# Patient Record
Sex: MALE | Race: WHITE | NOT HISPANIC OR LATINO | Employment: FULL TIME | URBAN - METROPOLITAN AREA
[De-identification: names, ages, dates, MRNs, and addresses within clinical notes are randomized per-mention and may not be internally consistent; named-entity substitution may affect disease eponyms.]

---

## 2017-06-07 ENCOUNTER — RADIANT APPOINTMENT (OUTPATIENT)
Dept: GENERAL RADIOLOGY | Facility: CLINIC | Age: 27
End: 2017-06-07
Attending: PHYSICIAN ASSISTANT
Payer: COMMERCIAL

## 2017-06-07 ENCOUNTER — OFFICE VISIT (OUTPATIENT)
Dept: FAMILY MEDICINE | Facility: CLINIC | Age: 27
End: 2017-06-07
Payer: COMMERCIAL

## 2017-06-07 VITALS
BODY MASS INDEX: 32.19 KG/M2 | WEIGHT: 205.1 LBS | DIASTOLIC BLOOD PRESSURE: 81 MMHG | TEMPERATURE: 100.6 F | HEART RATE: 102 BPM | HEIGHT: 67 IN | OXYGEN SATURATION: 97 % | SYSTOLIC BLOOD PRESSURE: 132 MMHG

## 2017-06-07 DIAGNOSIS — R05.9 COUGH: ICD-10-CM

## 2017-06-07 DIAGNOSIS — R07.0 THROAT PAIN: Primary | ICD-10-CM

## 2017-06-07 DIAGNOSIS — R50.9 FEVER, UNSPECIFIED: ICD-10-CM

## 2017-06-07 LAB
DEPRECATED S PYO AG THROAT QL EIA: NORMAL
MICRO REPORT STATUS: NORMAL
SPECIMEN SOURCE: NORMAL

## 2017-06-07 PROCEDURE — 71020 XR CHEST 2 VW: CPT

## 2017-06-07 PROCEDURE — 87880 STREP A ASSAY W/OPTIC: CPT | Performed by: PHYSICIAN ASSISTANT

## 2017-06-07 PROCEDURE — 99213 OFFICE O/P EST LOW 20 MIN: CPT | Performed by: PHYSICIAN ASSISTANT

## 2017-06-07 PROCEDURE — 87070 CULTURE OTHR SPECIMN AEROBIC: CPT | Performed by: PHYSICIAN ASSISTANT

## 2017-06-07 NOTE — PROGRESS NOTES
"SUBJECTIVE:                                                    Sergo Cabrera is a 27 year old male who presents to clinic today for the following health issues:    ENT Symptoms             Symptoms: cc Present Absent Comment   Fever/Chills x x     Fatigue  x     Muscle Aches  x  A little achey   Eye Irritation  x  \"mildly irritated\"   Sneezing   x    Nasal [place]/Drg  x  Congestion, mild   Sinus Pressure/Pain   x    Loss of smell   x    Dental pain   x    Sore Throat  x  Mild    Swollen Glands   x    Ear Pain/Fullness x x  pressure and little soreness - left   Cough x x     Wheeze   x    Chest Pain   x    Shortness of breath  x  Off and on \"a little bit\"   Rash   x    Other  x  Headache and neck pain - back of head. Improved with tylenol. Right now mild neck ache     Symptom duration:  Friday   Symptom severity:  moderate   Treatments tried:  OTC cough meds with no relief, Tylenol.  Nothing PO today   Contacts:  None     Has been having low grade fevers since Friday - 100-101. Day 6  Cough has been improving - was worse over the weekend. Worse at night. Mostly dry.  A little queasy last few days, didn't eat as much, better today  No recent travel  No abdominal pain or urinary symptoms     Problem list and histories reviewed & adjusted, as indicated.  Additional history: none    There is no problem list on file for this patient.    Past Surgical History:   Procedure Laterality Date     EYE SURGERY         Social History   Substance Use Topics     Smoking status: Never Smoker     Smokeless tobacco: Never Used     Alcohol use Yes      Comment: 2-3 drinks per month     Family History   Problem Relation Age of Onset     DIABETES Mother      HEART DISEASE Mother      HEART DISEASE Maternal Grandfather            ROS:  Other than noted above, general, HEENT, respiratory, cardiac, MS, and gastrointestinal systems are negative.     OBJECTIVE:                                                    /81 (BP Location: Right " "arm, Patient Position: Chair, Cuff Size: Adult Regular)  Pulse 102  Temp 100.6  F (38.1  C) (Tympanic)  Ht 5' 7.25\" (1.708 m)  Wt 205 lb 1.6 oz (93 kg)  SpO2 97%  BMI 31.88 kg/m2 Body mass index is 31.88 kg/(m^2).   GENERAL: healthy, alert, well nourished, well hydrated, no distress  EYES: Eyes grossly normal to inspection, extraocular movements - intact, and PERRL  HENT: ear canals- normal; TMs- normal; Nose- normal; Mouth- no ulcers, no lesions POSITIVE faint whiteness on tongue, a little bit of white patch both posterior buccal areas. Not patchy or thick like thrush. Discussed with Dr. Guillaume who agrees to monitor.  NECK: no tenderness, no adenopathy, no asymmetry, no masses, no stiffness; thyroid- normal to palpation  RESP: lungs clear to auscultation - no rales, no rhonchi, no wheezes  CV: regular rates and rhythm, normal S1 S2, no S3 or S4 and no murmur, no click or rub -  ABDOMEN: soft, no tenderness, no  hepatosplenomegaly, no masses, normal bowel sounds  MS: extremities- no gross deformities noted, no edema  Neck mild paracervical tenderness bilaterally, full ROM easily  SKIN: no suspicious lesions, no rashes    CXR - NEGATIVE   I independently viewed the x-ray, discussed with patient, and am awaiting radiology read.      ASSESSMENT/PLAN:                                                      ASSESSMENT/PLAN:      ICD-10-CM    1. Throat pain R07.0 Strep, Rapid Screen     Throat Culture Aerobic Bacterial   2. Cough R05 XR Chest 2 Views   3. Fever, unspecified R50.9      Patient well appearing, breathing easily in clinic, speaking in full sentences easily, no signs of cyanosis or respiratory distress.  Monitor symptoms the next couple days. Follow up if symptoms worsen or not improving, particularly fever.    Patient Instructions   Have plenty of rest and fluids  Monitor your symptoms and fever  Humidified air can be very helpful with cough  Continue OTC/tylenol as needed  Use nasal spray Afrin OTC for " 3-4 days in each nostril for congestion  Follow up if worsening symptoms or if not improving in a few days  Be seen urgently if worsening breathing     Jesusita Medina PA-C   [facility]

## 2017-06-07 NOTE — NURSING NOTE
"Chief Complaint   Patient presents with     Cough     Fever     Tachycardia       Initial /81 (BP Location: Right arm, Patient Position: Chair, Cuff Size: Adult Regular)  Pulse 102  Temp 100.6  F (38.1  C) (Tympanic)  Ht 5' 7.25\" (1.708 m)  Wt 205 lb 1.6 oz (93 kg)  SpO2 97%  BMI 31.88 kg/m2 Estimated body mass index is 31.88 kg/(m^2) as calculated from the following:    Height as of this encounter: 5' 7.25\" (1.708 m).    Weight as of this encounter: 205 lb 1.6 oz (93 kg).  Medication Reconciliation: complete   Beth Alejo CMA  "

## 2017-06-07 NOTE — MR AVS SNAPSHOT
"              After Visit Summary   6/7/2017    Sergo Cabrera    MRN: [id number]           Patient Information     Date Of Birth          1990        Visit Information        Provider Department      6/7/2017 8:00 AM Jesusita Medina PA-C [facility]        Today's Diagnoses     Throat pain    -  1    Cough          Care Instructions    Have plenty of rest and fluids  Monitor your symptoms and fever  Humidified air can be very helpful with cough  Continue OTC/tylenol as needed  Use nasal spray Afrin OTC for 3-4 days in each nostril for congestion  Follow up if worsening symptoms or if not improving in a few days  Be seen urgently if worsening breathing           Follow-ups after your visit        Who to contact     Normal or non-critical lab and imaging results will be communicated to you by "[employer]hart, letter or phone within 4 business days after the clinic has received the results. If you do not hear from us within 7 days, please contact the clinic through "[employer]hart or phone. If you have a critical or abnormal lab result, we will notify you by phone as soon as possible.  Submit refill requests through [employer] or call your pharmacy and they will forward the refill request to us. Please allow 3 business days for your refill to be completed.          If you need to speak with a  for additional information , please call: [phone]             Additional Information About Your Visit        [employer] Information     [employer] lets you send messages to your doctor, view your test results, renew your prescriptions, schedule appointments and more. To sign up, go to [email or web address] . Click on \"Log in\" on the left side of the screen, which will take you to the Welcome page. Then click on \"Sign up Now\" on the right side of the page.     You will be asked to enter the access code listed below, as well as some personal information. Please follow the directions to create your username and " "password.     Your access code is: [email or web address]  Expires: 2017  9:05 AM     Your access code will  in 90 days. If you need help or a new code, please call your [facility] or [phone].        Care EveryWhere ID     This is your Care EveryWhere ID. This could be used by other organizations to access your [facility] medical records  [id number]        Your Vitals Were     Pulse Temperature Height Pulse Oximetry BMI (Body Mass Index)       102 100.6  F (38.1  C) (Tympanic) 5' 7.25\" (1.708 m) 97% 31.88 kg/m2        Blood Pressure from Last 3 Encounters:   17 132/81   07/01/15 118/75   10/11/10 126/77    Weight from Last 3 Encounters:   17 205 lb 1.6 oz (93 kg)   07/01/15 180 lb (81.6 kg)   10/11/10 171 lb (77.6 kg)              We Performed the Following     Strep, Rapid Screen     Throat Culture Aerobic Bacterial        Primary Care Provider Office Phone # Fax #    Miguel Guillaume [phone]       [facility] [address] [facility] [address]        Thank you!     Thank you for choosing [facility]  for your care. Our goal is always to provide you with excellent care. Hearing back from our patients is one way we can continue to improve our services. Please take a few minutes to complete the written survey that you may receive in the mail after your visit with us. Thank you!             Your Updated Medication List - Protect others around you: Learn how to safely use, store and throw away your medicines at www.disposemymeds.org.          This list is accurate as of: 17  9:05 AM.  Always use your most recent med list.                   Brand Name Dispense Instructions for use    Multi-vitamin Tabs tablet      Take 1 tablet by mouth daily       NO ACTIVE MEDICATIONS            "

## 2017-06-07 NOTE — PATIENT INSTRUCTIONS
Have plenty of rest and fluids  Monitor your symptoms and fever  Humidified air can be very helpful with cough  Continue OTC/tylenol as needed  Use nasal spray Afrin OTC for 3-4 days in each nostril for congestion  Follow up if worsening symptoms or if not improving in a few days  Be seen urgently if worsening breathing

## 2017-06-09 LAB
BACTERIA SPEC CULT: NORMAL
MICRO REPORT STATUS: NORMAL
SPECIMEN SOURCE: NORMAL

## 2017-06-19 ENCOUNTER — OFFICE VISIT (OUTPATIENT)
Dept: FAMILY MEDICINE | Facility: CLINIC | Age: 27
End: 2017-06-19
Payer: COMMERCIAL

## 2017-06-19 VITALS
HEIGHT: 67 IN | TEMPERATURE: 98.7 F | BODY MASS INDEX: 32.24 KG/M2 | DIASTOLIC BLOOD PRESSURE: 80 MMHG | WEIGHT: 205.4 LBS | SYSTOLIC BLOOD PRESSURE: 110 MMHG | HEART RATE: 88 BPM

## 2017-06-19 DIAGNOSIS — J20.0 ACUTE BRONCHITIS DUE TO MYCOPLASMA PNEUMONIAE: Primary | ICD-10-CM

## 2017-06-19 DIAGNOSIS — J45.20 MILD INTERMITTENT ASTHMA WITHOUT COMPLICATION: ICD-10-CM

## 2017-06-19 PROCEDURE — 99214 OFFICE O/P EST MOD 30 MIN: CPT | Performed by: FAMILY MEDICINE

## 2017-06-19 RX ORDER — AZITHROMYCIN 250 MG/1
TABLET, FILM COATED ORAL
Qty: 6 TABLET | Refills: 0 | Status: SHIPPED | OUTPATIENT
Start: 2017-06-19 | End: 2017-09-11

## 2017-06-19 RX ORDER — ALBUTEROL SULFATE 90 UG/1
2 AEROSOL, METERED RESPIRATORY (INHALATION) EVERY 6 HOURS PRN
Qty: 1 INHALER | Refills: 0 | Status: SHIPPED | OUTPATIENT
Start: 2017-06-19 | End: 2018-03-22

## 2017-06-19 NOTE — MR AVS SNAPSHOT
"              After Visit Summary   2017    Sergo Cabrera    MRN: [id number]           Patient Information     Date Of Birth          1990        Visit Information        Provider Department      2017 9:40 AM Miguel Guillaume MD [facility]        Today's Diagnoses     Acute bronchitis due to Mycoplasma pneumoniae    -  1    Mild intermittent asthma without complication           Follow-ups after your visit        Who to contact     Normal or non-critical lab and imaging results will be communicated to you by [employer], letter or phone within 4 business days after the clinic has received the results. If you do not hear from us within 7 days, please contact the clinic through MyChart or phone. If you have a critical or abnormal lab result, we will notify you by phone as soon as possible.  Submit refill requests through [employer] or call your pharmacy and they will forward the refill request to us. Please allow 3 business days for your refill to be completed.          If you need to speak with a  for additional information , please call: [phone]             Additional Information About Your Visit        [employer] Information     [employer] lets you send messages to your doctor, view your test results, renew your prescriptions, schedule appointments and more. To sign up, go to [email or web address] . Click on \"Log in\" on the left side of the screen, which will take you to the Welcome page. Then click on \"Sign up Now\" on the right side of the page.     You will be asked to enter the access code listed below, as well as some personal information. Please follow the directions to create your username and password.     Your access code is: [email or web address]  Expires: 2017  9:05 AM     Your access code will  in 90 days. If you need help or a new code, please call your [facility] or [phone].        Care EveryWhere ID     This is your Care EveryWhere ID. This could be " "used by other organizations to access your [facility] medical records  [id number]        Your Vitals Were     Pulse Temperature Height BMI (Body Mass Index)          88 98.7  F (37.1  C) (Tympanic) 5' 7\" (1.702 m) 32.17 kg/m2         Blood Pressure from Last 3 Encounters:   06/19/17 110/80   06/07/17 132/81   07/01/15 118/75    Weight from Last 3 Encounters:   06/19/17 205 lb 6.4 oz (93.2 kg)   06/07/17 205 lb 1.6 oz (93 kg)   07/01/15 180 lb (81.6 kg)              Today, you had the following     No orders found for display         Today's Medication Changes          These changes are accurate as of: 6/19/17  2:58 PM.  If you have any questions, ask your nurse or doctor.               Start taking these medicines.        Dose/Directions    albuterol 108 (90 BASE) MCG/ACT Inhaler   Commonly known as:  PROAIR HFA/PROVENTIL HFA/VENTOLIN HFA   Used for:  Acute bronchitis due to Mycoplasma pneumoniae   Started by:  Miguel Guillaume MD        Dose:  2 puff   Inhale 2 puffs into the lungs every 6 hours as needed for shortness of breath / dyspnea or wheezing   Quantity:  1 Inhaler   Refills:  0       azithromycin 250 MG tablet   Commonly known as:  ZITHROMAX   Used for:  Acute bronchitis due to Mycoplasma pneumoniae   Started by:  Miguel Guillaume MD        Two tablets first day, then one tablet daily for four days.   Quantity:  6 tablet   Refills:  0            Where to get your medicines      These medications were sent to [id number] - [place], [place] - [address]  [address], [facility] [address]    Hours:  test fax successfully sent 10/22/03  Phone:  [phone]     albuterol 108 (90 BASE) MCG/ACT Inhaler    azithromycin 250 MG tablet                Primary Care Provider Office Phone # Fax #    Miguel Guillaume [phone]       [facility] [address] [address] [facility] [address]        Thank you!     Thank you for choosing [facility]  for your " care. Our goal is always to provide you with excellent care. Hearing back from our patients is one way we can continue to improve our services. Please take a few minutes to complete the written survey that you may receive in the mail after your visit with us. Thank you!             Your Updated Medication List - Protect others around you: Learn how to safely use, store and throw away your medicines at www.disposemymeds.org.          This list is accurate as of: 6/19/17  2:58 PM.  Always use your most recent med list.                   Brand Name Dispense Instructions for use    albuterol 108 (90 BASE) MCG/ACT Inhaler    PROAIR HFA/PROVENTIL HFA/VENTOLIN HFA    1 Inhaler    Inhale 2 puffs into the lungs every 6 hours as needed for shortness of breath / dyspnea or wheezing       azithromycin 250 MG tablet    ZITHROMAX    6 tablet    Two tablets first day, then one tablet daily for four days.       Multi-vitamin Tabs tablet      Take 1 tablet by mouth daily       NO ACTIVE MEDICATIONS

## 2017-06-19 NOTE — PROGRESS NOTES
"  SUBJECTIVE:                                                    Sergo Cabrera is a 27 year old male who presents to clinic today for the following health issues:      ENT Symptoms             Symptoms: cc Present Absent Comment   Fever/Chills   x    Fatigue   x    Muscle Aches  x     Eye Irritation  x     Sneezing   x    Nasal [place]/Drg  x     Sinus Pressure/Pain  x     Loss of smell   x    Dental pain   x    Sore Throat  x     Swollen Glands  x     Ear Pain/Fullness   x    Cough  x     Wheeze  x     Chest Pain  x     Shortness of breath  x     Rash   x    Other   x      Symptom duration:  2 weeks    Symptom severity:  moderate    Treatments tried:  decongestant with some relief, anthistamine   Contacts:  none         Problem list and histories reviewed & adjusted, as indicated.  Additional history: none    Patient Active Problem List   Diagnosis   (none) - all problems resolved or deleted     Past Surgical History:   Procedure Laterality Date     EYE SURGERY         Social History   Substance Use Topics     Smoking status: Never Smoker     Smokeless tobacco: Never Used     Alcohol use Yes      Comment: 2-3 drinks per month     Family History   Problem Relation Age of Onset     DIABETES Mother      HEART DISEASE Mother      HEART DISEASE Maternal Grandfather            Reviewed and updated as needed this visit by clinical staff       Reviewed and updated as needed this visit by Provider         ROS:  Constitutional, HEENT, cardiovascular, pulmonary, gi and gu systems are negative, except as otherwise noted.    OBJECTIVE:                                                    /80 (BP Location: Right arm, Patient Position: Chair, Cuff Size: Adult Regular)  Pulse 88  Temp 98.7  F (37.1  C) (Tympanic)  Ht 5' 7\" (1.702 m)  Wt 205 lb 6.4 oz (93.2 kg)  BMI 32.17 kg/m2  Body mass index is 32.17 kg/(m^2).  GENERAL: healthy, alert and no distress  NECK: no adenopathy, no asymmetry, masses, or scars and thyroid normal " to palpation  RESP: lungs clear to auscultation - no rales, rhonchi or wheezes  CV: regular rate and rhythm, normal S1 S2, no S3 or S4, no murmur, click or rub, no peripheral edema and peripheral pulses strong  ABDOMEN: soft, nontender, no hepatosplenomegaly, no masses and bowel sounds normal  MS: no gross musculoskeletal defects noted, no edema    Diagnostic Test Results:  none      ASSESSMENT/PLAN:                                                        1. Acute bronchitis due to Mycoplasma pneumoniae    - azithromycin (ZITHROMAX) 250 MG tablet; Two tablets first day, then one tablet daily for four days.  Dispense: 6 tablet; Refill: 0  - albuterol (PROAIR HFA/PROVENTIL HFA/VENTOLIN HFA) 108 (90 BASE) MCG/ACT Inhaler; Inhale 2 puffs into the lungs every 6 hours as needed for shortness of breath / dyspnea or wheezing  Dispense: 1 Inhaler; Refill: 0    2. Mild intermittent asthma without complication  He has asthma as child none recently  Good control.  Continue currant medications, continue to monito       Miguel Guillaume MD  [facility]

## 2017-09-11 ENCOUNTER — OFFICE VISIT (OUTPATIENT)
Dept: FAMILY MEDICINE | Facility: CLINIC | Age: 27
End: 2017-09-11
Payer: COMMERCIAL

## 2017-09-11 ENCOUNTER — RADIANT APPOINTMENT (OUTPATIENT)
Dept: GENERAL RADIOLOGY | Facility: CLINIC | Age: 27
End: 2017-09-11
Attending: FAMILY MEDICINE
Payer: COMMERCIAL

## 2017-09-11 VITALS
BODY MASS INDEX: 33.65 KG/M2 | SYSTOLIC BLOOD PRESSURE: 115 MMHG | DIASTOLIC BLOOD PRESSURE: 73 MMHG | TEMPERATURE: 97.6 F | HEART RATE: 75 BPM | WEIGHT: 214.4 LBS | HEIGHT: 67 IN

## 2017-09-11 DIAGNOSIS — M54.2 CERVICALGIA: ICD-10-CM

## 2017-09-11 DIAGNOSIS — G47.01 INSOMNIA DUE TO MEDICAL CONDITION: ICD-10-CM

## 2017-09-11 DIAGNOSIS — M54.2 CERVICALGIA: Primary | ICD-10-CM

## 2017-09-11 PROCEDURE — 72040 X-RAY EXAM NECK SPINE 2-3 VW: CPT

## 2017-09-11 PROCEDURE — 99214 OFFICE O/P EST MOD 30 MIN: CPT | Performed by: FAMILY MEDICINE

## 2017-09-11 NOTE — MR AVS SNAPSHOT
After Visit Summary   9/11/2017    Sergo Cabrera    MRN: [id number]           Patient Information     Date Of Birth          1990        Visit Information        Provider Department      9/11/2017 11:00 AM Miguel Guillaume MD [facility]        Today's Diagnoses     Cervicalgia    -  1      Care Instructions    Do neck stretching before bed,  Try heat.  Use melatonin to help get to sleep  Try the listed self relaxation for insomnia exercises  If not improving see physical therapists.    Watch posture  Keep shoulders back    Relaxation technique 1: Breathing meditation for stress relief  With its focus on full, cleansing breaths, deep breathing is a simple, yet powerful, relaxation technique. It s easy to learn, can be practiced almost anywhere, and provides a quick way to get your stress levels in check. Deep breathing is the cornerstone of many other relaxation practices, too, and can be combined with other relaxing elements such as aromatherapy and music. All you really need is a few minutes and a place to stretch out.  Practicing deep breathing meditation  The key to deep breathing is to breathe deeply from the abdomen, getting as much fresh air as possible in your lungs. When you take deep breaths from the abdomen, rather than shallow breaths from your upper chest, you inhale more oxygen. The more oxygen you get, the less tense, short of breath, and anxious you feel.  Sit comfortably with your back straight. Put one hand on your chest and the other on your stomach.   Breathe in through your nose. The hand on your stomach should rise. The hand on your chest should move very little.   Exhale through your mouth, pushing out as much air as you can while jackie your abdominal muscles. The hand on your stomach should move in as you exhale, but your other hand should move very little.   Continue to breathe in through your nose and out through your mouth. Try to inhale enough so that  your lower abdomen rises and falls. Count slowly as you exhale.   If you find it difficult breathing from your abdomen while sitting up, try lying on the floor. Put a small book on your stomach, and try to breathe so that the book rises as you inhale and falls as you exhale.   Relaxation technique 2: Progressive muscle relaxation for stress relief  Progressive muscle relaxation involves a two-step process in which you systematically tense and relax different muscle groups in the body.  With regular practice, progressive muscle relaxation gives you an intimate familiarity with what tension--as well as complete relaxation--feels like in different parts of the body. This awareness helps you spot and counteract the first signs of the muscular tension that accompanies stress. And as your body relaxes, so will your mind. You can combine deep breathing with progressive muscle relaxation for an additional level of stress relief.  Practicing progressive muscle relaxation  Before practicing Progressive Muscle Relaxation, consult with your doctor if you have a history of muscle spasms, back problems, or other serious injuries that may be aggravated by tensing muscles.  Most progressive muscle relaxation practitioners start at the feet and work their way up to the face. For a sequence of muscle groups to follow, see the box below.  Loosen your clothing, take off your shoes, and get comfortable.   Take a few minutes to relax, breathing in and out in slow, deep breaths.   When you re relaxed and ready to start, shift your attention to your right foot. Take a moment to focus on the way it feels.   Slowly tense the muscles in your right foot, squeezing as tightly as you can. Hold for a count of 10.   Relax your right foot. Focus on the tension flowing away and the way your foot feels as it becomes limp and loose.   Stay in this relaxed state for a moment, breathing deeply and slowly.   When you re ready, shift your attention to  your left foot. Follow the same sequence of muscle tension and release.   Move slowly up through your body, jackie and relaxing the muscle groups as you go.   It may take some practice at first, but try not to tense muscles other than those intended.   Progressive Muscle Relaxation Sequence  The most popular sequence runs as follows:  1. Right foot*   2. Left foot   3. Right calf   4. Left calf   5. Right thigh  6. Left thigh   7. Hips and buttocks   8. Stomach   9. Chest   10. Back  11. Right arm and hand   12. Left arm and hand   13. Neck and shoulders   14. Face    * If you are left-handed you may want to begin with your left foot instead.  Relaxation technique 3: Body scan meditation for stress relief  A body scan is similar to progressive muscle relaxation except, instead of tensing and relaxing muscles, you simply focus on the sensations in each part of your body.   Practicing body scan meditation  Lie on your back, legs uncrossed, arms relaxed at your sides, eyes open or closed. Focus on your breathing , allowing your stomach to rise as you inhale and fall as you exhale. Breathe deeply for about two minutes, until you start to feel comfortable and relaxed.   Turn your focus to the toes of your right foot. Notice any sensations you feel while continuing to also focus on your breathing. Imagine each deep breath flowing to your toes. Remain focused on this area for one to two minutes.   Move your focus to the sole of your right foot. Tune in to any sensations you feel in that part of your body and imagine each breath flowing from the sole of your foot. After one or two minutes, move your focus to your right ankle and repeat. Move to your calf, knee, thigh, hip, and then repeat the sequence for your left leg. From there, move up the torso, through the lower back and abdomen, the upper back and chest, and the shoulders. Pay close attention to any area of the body that causes you pain or discomfort.   Move  your focus to the fingers on your right hand and then move up to the wrist, forearm, elbow, upper arm, and shoulder. Repeat for your left arm. Then move through the neck and throat, and finally all the regions of your face, the back of the head, and the top of the head. Pay close attention to your jaw, chin, lips, tongue, nose, cheeks, eyes, forehead, temples and scalp. When you reach the very top of your head, let your breath reach out beyond your body and imagine yourself hovering above yourself.   After completing the body scan, relax for a while in silence and stillness, noting how your body feels. Then open your eyes slowly. Take a moment to stretch, if necessary.   For a guided body scan meditation, see the Resources section below.  Relaxation technique 4: Mindfulness for stress relief  Mindfulness is the ability to remain aware of how you re feeling right now, your  [id number]  experience--both internal and external. Thinking about the past--blaming and judging yourself--or worrying about the future can often lead to a degree of stress that is overwhelming. But by staying calm and focused in the present moment, you can bring your nervous system back into balance. Mindfulness can be applied to activities such as walking, exercising, eating, or meditation.  Meditations that cultivate mindfulness have long been used to reduce overwhelming stress. Some of these meditations bring you into the present by focusing your attention on a single repetitive action, such as your breathing, a few repeated words, or flickering light from a candle. Other forms of mindfulness meditation encourage you to follow and then release internal thoughts or sensations.  Practicing mindfulness meditation  Key points in mindfulness mediation are:  A quiet environment. Choose a secluded place in your home, office, garden, place of Jehovah's witness, or in the great outdoors where you can relax without distractions or interruptions.   A  comfortable position. Get comfortable, but avoid lying down as this may lead to you falling asleep. Sit up with your spine straight, either in a chair or on the floor. You can also try a cross-legged or chucho position.   A point of focus. This point can be internal - a feeling or imaginary scene - or something external - a flame or meaningful word or phrase that you repeat it throughout your session. You may meditate with eyes open or closed. Also choose to focus on an object in your surroundings to enhance your concentration, or alternately, you can close your eyes.   An observant, noncritical attitude. Don t worry about distracting thoughts that go through your mind or about how well you re doing. If thoughts intrude during your relaxation session, don t fight them. Instead, gently turn your attention back to your point of focus.   Relaxation technique 5: Visualization meditation for stress relief  Visualization, or guided imagery, is a variation on traditional meditation that requires you to employ not only your visual sense, but also your sense of taste, touch, smell, and sound. When used as a relaxation technique, visualization involves imagining a scene in which you feel at peace, free to let go of all tension and anxiety.  Choose whatever setting is most calming to you, whether it s a tropical beach, a favorite childhood spot, or a quiet [place]ed [place]. You can do this visualization exercise on your own in silence, while listening to soothing music, or with a therapist (or an audio recording of a therapist) guiding you through the imagery. To help you employ your sense of hearing you can use a sound machine or download sounds that match your chosen setting--the sound of ocean waves if you ve chosen a beach, for example.  Practicing visualization  Find a quiet, relaxed place. Beginners sometimes fall asleep during a visualization meditation, so you might try sitting up or standing.  Close your eyes and let your  worries drift away. Imagine your restful place. Picture it as vividly as you can--everything you can see, hear, smell, and feel. Visualization works best if you incorporate as many sensory details as possible, using at least three of your senses. When visualizing, choose imagery that appeals to you; don t select images because someone else suggests them, or because you think they should be appealing. Let your own images come up and work for you.  If you are thinking about a dock on a quiet lake, for example:   Walk slowly around the dock and notice the colors and textures around you.   Spend some time exploring each of your senses.   See the sun setting over the water.   Hear the birds singing.   Smell the pine trees.   Feel the cool water on your bare feet.   Taste the fresh, clean air.   Enjoy the feeling of deep relaxation that envelopes you as you slowly explore your restful place. When you are ready, gently open your eyes and come back to the present.  Don't worry if you sometimes zone out or lose track of where you are during a guided imagery session. This is normal. You may also experience feelings of stiffness or heaviness in your limbs, minor, involuntary muscle-movements, or even cough or yawn. Again, these are normal responses.   Relaxation technique 6: Yoga and cary chi for stress relief  Yoga involves a series of both moving and stationary poses, combined with deep breathing. As well as reducing anxiety and stress, yoga can also improve flexibility, strength, balance, and stamina. Practiced regularly, it can also strengthen the relaxation response in your daily life. Since injuries can happen when yoga is practiced incorrectly, it s best to learn by attending group classes, hiring a private teacher, or at least following video instructions.  What type of yoga is best for stress?  Although almost all yoga classes end in a relaxation pose, classes that emphasize slow, steady movement, deep breathing, and  gentle stretching are best for stress relief.  Satyananda is a traditional form of yoga. It features gentle poses, deep relaxation, and meditation, making it suitable for beginners as well as anyone primarily looking for stress reduction.   Hatha yoga is also reasonably gentle way to relieve stress and is suitable for beginners. Alternately, look for labels like gentle, for stress relief, or for beginners when selecting a yoga class.   Power yoga, with its intense poses and focus on fitness, is better suited to those looking for stimulation as well as relaxation.   If you re unsure whether a specific yoga class is appropriate for stress relief, call the studio or ask the teacher.  Rik chi  If you ve ever seen a group of people in the park slowly moving in synch, you ve probably witnessed rik chi. Rik chi is a self-paced, non-competitive series of slow, flowing body movements. These movements emphasize concentration, relaxation, and the conscious circulation of vital energy throughout the body. Though rik chi has its roots in martial arts, today it is primarily practiced as a way of calming the mind, conditioning the body, and reducing stress. As in meditation, rik chi practitioners focus on their breathing and keeping their attention in the present moment.  Rik chi is a safe, low-impact option for people of all ages and levels of fitness, including older adults and those recovering from injuries. Like yoga, once you ve learned the basics of rik chi or qi gong, you can practice alone or with others, tailoring your sessions as you see fit.   Making relaxation techniques a part of your life  The best way to start and maintain a relaxation practice is to incorporate it into your daily routine. Between work, family, school, and other commitments, though, it can be tough for many people to find the time. Fortunately, many of the techniques can be practiced while you re doing other things.  Rhythmic exercise as a  mindfulness relaxation technique  Rhythmic exercise--such as running, walking, rowing, or cycling--is most effective at relieving stress when performed with relaxation in mind. As with meditation, mindfulness requires being fully engaged in the present moment, focusing your mind on how your body feels right now. As you exercise, focus on the physicality of your body s movement and how your breathing complements that movement. If your mind wanders to other thoughts, gently return to focusing on your breathing and movement.  If walking or running, for example, focus on each step--the sensation of your feet touching the ground, the rhythm of your breath while moving, and the feeling of the wind against your face.  Tips for fitting relaxation techniques into your life  If possible, schedule a set time to practice each day. Set aside one or two periods each day. You may find that it s easier to stick with your practice if you do it first thing in the morning, before other tasks and responsibilities get in the way.   Practice relaxation techniques while you re doing other things. Meditate while commuting to work on a bus or train, or waiting for a dentist appointment. Try deep breathing while you re doing housework or mowing the lawn. Mindfulness walking can be done while exercising your dog, walking to your car, or climbing the stairs at work instead of using the elevator. Once you ve learned techniques such as cary chi, you can practice them in your office or in the park at lunchtime.   If you exercise, improve the relaxation benefits by adopting mindfulness. Instead of zoning out or staring at a TV as you exercise, try focusing your attention on your body. If you re resistance training, for example, focus on coordinating your breathing with your movements and pay attention to how your body feels as you raise and lower the weights.   Avoid practicing when you re sleepy. These techniques can relax you so much that they  can make you very sleepy, especially if it s close to bedtime. You will get the most benefit if you practice when you re fully awake and alert. Do not practice after eating a heavy meal or while using drugs, tobacco, or alcohol.   Expect ups and downs. Don t be discouraged if you skip a few days or even a few weeks. It happens. Just get started again and slowly build up to your old momentum.               Follow-ups after your visit        Additional Services     [facility] PT, HAND, AND CHIROPRACTIC REFERRAL       **This order will print in the [facility] Scheduling Office**    Physical Therapy, Hand Therapy and Chiropractic Care are available through:    *[facility]  *[facility]  *[facility]    Call one number to schedule at any of the above locations: [phone].    Your provider has referred you to: Physical Therapy at [facility] or [facility]    Indication/Reason for Referral: Neck Pain  Onset of Illness:   Therapy Orders: Evaluate and Treat  Special Programs:   Special Request:     Trung Nino      Additional Comments for the Therapist or Chiropractor:     Please be aware that coverage of these services is subject to the terms and limitations of your health insurance plan.  Call member services at your health plan with any benefit or coverage questions.      Please bring the following to your appointment:    *Your personal calendar for scheduling future appointments  *Comfortable clothing                  Who to contact     Normal or non-critical lab and imaging results will be communicated to you by MyChart, letter or phone within 4 business days after the clinic has received the results. If you do not hear from us within 7 days, please contact the clinic through MyChart or phone. If you have a critical or abnormal lab result, we will notify you by phone as soon as possible.  Submit refill requests through [employer] or call your pharmacy and they will forward the refill  "request to us. Please allow 3 business days for your refill to be completed.          If you need to speak with a  for additional information , please call: [phone]             Additional Information About Your Visit        [facility] Information     [employer] lets you send messages to your doctor, view your test results, renew your prescriptions, schedule appointments and more. To sign up, go to [email or web address] . Click on \"Log in\" on the left side of the screen, which will take you to the Welcome page. Then click on \"Sign up Now\" on the right side of the page.     You will be asked to enter the access code listed below, as well as some personal information. Please follow the directions to create your username and password.     Your access code is: [email or web address]  Expires: 12/10/2017 11:37 AM     Your access code will  in 90 days. If you need help or a new code, please call your [facility] clinic or [phone].        Care EveryWhere ID     This is your Care EveryWhere ID. This could be used by other organizations to access your [facility] medical records  [id number]        Your Vitals Were     Pulse Temperature Height BMI (Body Mass Index)          75 97.6  F (36.4  C) (Tympanic) 5' 7\" (1.702 m) 33.58 kg/m2         Blood Pressure from Last 3 Encounters:   17 115/73   17 110/80   17 132/81    Weight from Last 3 Encounters:   17 214 lb 6.4 oz (97.3 kg)   17 205 lb 6.4 oz (93.2 kg)   17 205 lb 1.6 oz (93 kg)              We Performed the Following     [facility] PT, HAND, AND CHIROPRACTIC REFERRAL        Primary Care Provider Office Phone # Fax #    Miguel Guillaume [phone]       [facility] [address] [facility] [address]        Equal Access to Services     KEVEN MORALES AH: Kyler Graham, tanya venegas, ronnell torres la'aan ah. [facility] [phone].    ATENCIÓN: Si " frederick da silva, tiene a masters disposición servicios gratuitos de asistencia lingüística. Debra brasher [phone].    We comply with applicable federal civil rights laws and [place] laws. We do not discriminate on the basis of race, color, national origin, age, disability sex, sexual orientation or gender identity.            Thank you!     Thank you for choosing [facility]  for your care. Our goal is always to provide you with excellent care. Hearing back from our patients is one way we can continue to improve our services. Please take a few minutes to complete the written survey that you may receive in the mail after your visit with us. Thank you!             Your Updated Medication List - Protect others around you: Learn how to safely use, store and throw away your medicines at www.disposemymeds.org.          This list is accurate as of: 9/11/17 11:37 AM.  Always use your most recent med list.                   Brand Name Dispense Instructions for use Diagnosis    albuterol 108 (90 BASE) MCG/ACT Inhaler    PROAIR HFA/PROVENTIL HFA/VENTOLIN HFA    1 Inhaler    Inhale 2 puffs into the lungs every 6 hours as needed for shortness of breath / dyspnea or wheezing    Acute bronchitis due to Mycoplasma pneumoniae       Multi-vitamin Tabs tablet      Take 1 tablet by mouth daily        NO ACTIVE MEDICATIONS

## 2017-09-11 NOTE — PATIENT INSTRUCTIONS
Do neck stretching before bed,  Try heat.  Use melatonin to help get to sleep  Try the listed self relaxation for insomnia exercises  If not improving see physical therapists.    Watch posture  Keep shoulders back    Relaxation technique 1: Breathing meditation for stress relief  With its focus on full, cleansing breaths, deep breathing is a simple, yet powerful, relaxation technique. It s easy to learn, can be practiced almost anywhere, and provides a quick way to get your stress levels in check. Deep breathing is the cornerstone of many other relaxation practices, too, and can be combined with other relaxing elements such as aromatherapy and music. All you really need is a few minutes and a place to stretch out.  Practicing deep breathing meditation  The key to deep breathing is to breathe deeply from the abdomen, getting as much fresh air as possible in your lungs. When you take deep breaths from the abdomen, rather than shallow breaths from your upper chest, you inhale more oxygen. The more oxygen you get, the less tense, short of breath, and anxious you feel.  Sit comfortably with your back straight. Put one hand on your chest and the other on your stomach.   Breathe in through your nose. The hand on your stomach should rise. The hand on your chest should move very little.   Exhale through your mouth, pushing out as much air as you can while jackie your abdominal muscles. The hand on your stomach should move in as you exhale, but your other hand should move very little.   Continue to breathe in through your nose and out through your mouth. Try to inhale enough so that your lower abdomen rises and falls. Count slowly as you exhale.   If you find it difficult breathing from your abdomen while sitting up, try lying on the floor. Put a small book on your stomach, and try to breathe so that the book rises as you inhale and falls as you exhale.   Relaxation technique 2: Progressive muscle relaxation for stress  relief  Progressive muscle relaxation involves a two-step process in which you systematically tense and relax different muscle groups in the body.  With regular practice, progressive muscle relaxation gives you an intimate familiarity with what tension as well as complete relaxation feels like in different parts of the body. This awareness helps you spot and counteract the first signs of the muscular tension that accompanies stress. And as your body relaxes, so will your mind. You can combine deep breathing with progressive muscle relaxation for an additional level of stress relief.  Practicing progressive muscle relaxation  Before practicing Progressive Muscle Relaxation, consult with your doctor if you have a history of muscle spasms, back problems, or other serious injuries that may be aggravated by tensing muscles.  Most progressive muscle relaxation practitioners start at the feet and work their way up to the face. For a sequence of muscle groups to follow, see the box below.  Loosen your clothing, take off your shoes, and get comfortable.   Take a few minutes to relax, breathing in and out in slow, deep breaths.   When you re relaxed and ready to start, shift your attention to your right foot. Take a moment to focus on the way it feels.   Slowly tense the muscles in your right foot, squeezing as tightly as you can. Hold for a count of 10.   Relax your right foot. Focus on the tension flowing away and the way your foot feels as it becomes limp and loose.   Stay in this relaxed state for a moment, breathing deeply and slowly.   When you re ready, shift your attention to your left foot. Follow the same sequence of muscle tension and release.   Move slowly up through your body, jackie and relaxing the muscle groups as you go.   It may take some practice at first, but try not to tense muscles other than those intended.   Progressive Muscle Relaxation Sequence  The most popular sequence runs as  follows:  1. Right foot*   2. Left foot   3. Right calf   4. Left calf   5. Right thigh  6. Left thigh   7. Hips and buttocks   8. Stomach   9. Chest   10. Back  11. Right arm and hand   12. Left arm and hand   13. Neck and shoulders   14. Face    * If you are left-handed you may want to begin with your left foot instead.  Relaxation technique 3: Body scan meditation for stress relief  A body scan is similar to progressive muscle relaxation except, instead of tensing and relaxing muscles, you simply focus on the sensations in each part of your body.   Practicing body scan meditation  Lie on your back, legs uncrossed, arms relaxed at your sides, eyes open or closed. Focus on your breathing , allowing your stomach to rise as you inhale and fall as you exhale. Breathe deeply for about two minutes, until you start to feel comfortable and relaxed.   Turn your focus to the toes of your right foot. Notice any sensations you feel while continuing to also focus on your breathing. Imagine each deep breath flowing to your toes. Remain focused on this area for one to two minutes.   Move your focus to the sole of your right foot. Tune in to any sensations you feel in that part of your body and imagine each breath flowing from the sole of your foot. After one or two minutes, move your focus to your right ankle and repeat. Move to your calf, knee, thigh, hip, and then repeat the sequence for your left leg. From there, move up the torso, through the lower back and abdomen, the upper back and chest, and the shoulders. Pay close attention to any area of the body that causes you pain or discomfort.   Move your focus to the fingers on your right hand and then move up to the wrist, forearm, elbow, upper arm, and shoulder. Repeat for your left arm. Then move through the neck and throat, and finally all the regions of your face, the back of the head, and the top of the head. Pay close attention to your jaw, chin, lips, tongue, nose,  cheeks, eyes, forehead, temples and scalp. When you reach the very top of your head, let your breath reach out beyond your body and imagine yourself hovering above yourself.   After completing the body scan, relax for a while in silence and stillness, noting how your body feels. Then open your eyes slowly. Take a moment to stretch, if necessary.   For a guided body scan meditation, see the Resources section below.  Relaxation technique 4: Mindfulness for stress relief  Mindfulness is the ability to remain aware of how you re feeling right now, your  [id number]  experience both internal and external. Thinking about the past blaming and judging yourself or worrying about the future can often lead to a degree of stress that is overwhelming. But by staying calm and focused in the present moment, you can bring your nervous system back into balance. Mindfulness can be applied to activities such as walking, exercising, eating, or meditation.  Meditations that cultivate mindfulness have long been used to reduce overwhelming stress. Some of these meditations bring you into the present by focusing your attention on a single repetitive action, such as your breathing, a few repeated words, or flickering light from a candle. Other forms of mindfulness meditation encourage you to follow and then release internal thoughts or sensations.  Practicing mindfulness meditation  Key points in mindfulness mediation are:  A quiet environment. Choose a secluded place in your home, office, garden, place of [place], or in the great outdoors where you can relax without distractions or interruptions.   A comfortable position. Get comfortable, but avoid lying down as this may lead to you falling asleep. Sit up with your spine straight, either in a chair or on the floor. You can also try a cross-legged or chucho position.   A point of focus. This point can be internal   a feeling or imaginary scene   or something external - a flame or  meaningful word or phrase that you repeat it throughout your session. You may meditate with eyes open or closed. Also choose to focus on an object in your surroundings to enhance your concentration, or alternately, you can close your eyes.   An observant, noncritical attitude. Don t worry about distracting thoughts that go through your mind or about how well you re doing. If thoughts intrude during your relaxation session, don t fight them. Instead, gently turn your attention back to your point of focus.   Relaxation technique 5: Visualization meditation for stress relief  Visualization, or guided imagery, is a variation on traditional meditation that requires you to employ not only your visual sense, but also your sense of taste, touch, smell, and sound. When used as a relaxation technique, visualization involves imagining a scene in which you feel at peace, free to let go of all tension and anxiety.  Choose whatever setting is most calming to you, whether it s a tropical beach, a favorite childhood spot, or a quiet [place]ed [place]. You can do this visualization exercise on your own in silence, while listening to soothing music, or with a therapist (or an audio recording of a therapist) guiding you through the imagery. To help you employ your sense of hearing you can use a sound machine or download sounds that match your chosen setting the sound of ocean waves if you ve chosen a beach, for example.  Practicing visualization  Find a quiet, relaxed place. Beginners sometimes fall asleep during a visualization meditation, so you might try sitting up or standing.  Close your eyes and let your worries drift away. Imagine your restful place. Picture it as vividly as you can everything you can see, hear, smell, and feel. Visualization works best if you incorporate as many sensory details as possible, using at least three of your senses. When visualizing, choose imagery that appeals to you; don t select images because someone  else suggests them, or because you think they should be appealing. Let your own images come up and work for you.  If you are thinking about a dock on a quiet lake, for example:   Walk slowly around the dock and notice the colors and textures around you.   Spend some time exploring each of your senses.   See the sun setting over the water.   Hear the birds singing.   Smell the pine trees.   Feel the cool water on your bare feet.   Taste the fresh, clean air.   Enjoy the feeling of deep relaxation that envelopes you as you slowly explore your restful place. When you are ready, gently open your eyes and come back to the present.  Don't worry if you sometimes zone out or lose track of where you are during a guided imagery session. This is normal. You may also experience feelings of stiffness or heaviness in your limbs, minor, involuntary muscle-movements, or even cough or yawn. Again, these are normal responses.   Relaxation technique 6: Yoga and cary chi for stress relief  Yoga involves a series of both moving and stationary poses, combined with deep breathing. As well as reducing anxiety and stress, yoga can also improve flexibility, strength, balance, and stamina. Practiced regularly, it can also strengthen the relaxation response in your daily life. Since injuries can happen when yoga is practiced incorrectly, it s best to learn by attending group classes, hiring a private teacher, or at least following video instructions.  What type of yoga is best for stress?  Although almost all yoga classes end in a relaxation pose, classes that emphasize slow, steady movement, deep breathing, and gentle stretching are best for stress relief.  Satyananda is a traditional form of yoga. It features gentle poses, deep relaxation, and meditation, making it suitable for beginners as well as anyone primarily looking for stress reduction.   Hatha yoga is also reasonably gentle way to relieve stress and is suitable for beginners.  Alternately, look for labels like gentle, for stress relief, or for beginners when selecting a yoga class.   Power yoga, with its intense poses and focus on fitness, is better suited to those looking for stimulation as well as relaxation.   If you re unsure whether a specific yoga class is appropriate for stress relief, call the studio or ask the teacher.  Rik chi  If you ve ever seen a group of people in the park slowly moving in synch, you ve probably witnessed rik chi. Rik chi is a self-paced, non-competitive series of slow, flowing body movements. These movements emphasize concentration, relaxation, and the conscious circulation of vital energy throughout the body. Though rik chi has its roots in martial arts, today it is primarily practiced as a way of calming the mind, conditioning the body, and reducing stress. As in meditation, rik chi practitioners focus on their breathing and keeping their attention in the present moment.  Rik chi is a safe, low-impact option for people of all ages and levels of fitness, including older adults and those recovering from injuries. Like yoga, once you ve learned the basics of rik chi or qi gong, you can practice alone or with others, tailoring your sessions as you see fit.   Making relaxation techniques a part of your life  The best way to start and maintain a relaxation practice is to incorporate it into your daily routine. Between work, family, school, and other commitments, though, it can be tough for many people to find the time. Fortunately, many of the techniques can be practiced while you re doing other things.  Rhythmic exercise as a mindfulness relaxation technique  Rhythmic exercise such as running, walking, rowing, or cycling is most effective at relieving stress when performed with relaxation in mind. As with meditation, mindfulness requires being fully engaged in the present moment, focusing your mind on how your body feels right now. As you exercise, focus on  the physicality of your body s movement and how your breathing complements that movement. If your mind wanders to other thoughts, gently return to focusing on your breathing and movement.  If walking or running, for example, focus on each step the sensation of your feet touching the ground, the rhythm of your breath while moving, and the feeling of the wind against your face.  Tips for fitting relaxation techniques into your life  If possible, schedule a set time to practice each day. Set aside one or two periods each day. You may find that it s easier to stick with your practice if you do it first thing in the morning, before other tasks and responsibilities get in the way.   Practice relaxation techniques while you re doing other things. Meditate while commuting to work on a bus or train, or waiting for a dentist appointment. Try deep breathing while you re doing housework or mowing the lawn. Mindfulness walking can be done while exercising your dog, walking to your car, or climbing the stairs at work instead of using the elevator. Once you ve learned techniques such as cary chi, you can practice them in your office or in the park at lunchtime.   If you exercise, improve the relaxation benefits by adopting mindfulness. Instead of zoning out or staring at a TV as you exercise, try focusing your attention on your body. If you re resistance training, for example, focus on coordinating your breathing with your movements and pay attention to how your body feels as you raise and lower the weights.   Avoid practicing when you re sleepy. These techniques can relax you so much that they can make you very sleepy, especially if it s close to bedtime. You will get the most benefit if you practice when you re fully awake and alert. Do not practice after eating a heavy meal or while using drugs, tobacco, or alcohol.   Expect ups and downs. Don t be discouraged if you skip a few days or even a few weeks. It happens. Just get  started again and slowly build up to your old momentum.

## 2017-09-11 NOTE — PROGRESS NOTES
"SUBJECTIVE:                                                    Sergo Cabrera is a 27 year old male who presents to clinic today for the following health issues:    Concern - Difficulty Sleeping  Onset: a week    Description:   Takes him about 30 minutes to an hour to fall asleep at night. Has been trying to get 7 to 7.5 hours of sleep a night. He is in a deep sleep at night.    Intensity: moderate    Progression of Symptoms:  improving    Accompanying Signs & Symptoms:  Is having some aches and pains. Especially in the shoulders and nec. He thinks this might be what is keeping him awake.     Previous history of similar problem:   no    Precipitating factors:   Worsened by: none    Alleviating factors:  Improved by: a massage or relaxing for a few days.     Therapies Tried and outcome: tylenol and ibuprofen seems to help from time to time.      Problem list and histories reviewed & adjusted, as indicated.  Additional history:     Patient Active Problem List   Diagnosis   (none) - all problems resolved or deleted     Past Surgical History:   Procedure Laterality Date     EYE SURGERY         Social History   Substance Use Topics     Smoking status: Never Smoker     Smokeless tobacco: Never Used     Alcohol use Yes      Comment: 2-3 drinks per month     Family History   Problem Relation Age of Onset     DIABETES Mother      HEART DISEASE Mother      HEART DISEASE Maternal Grandfather              ROS:  Constitutional, HEENT, cardiovascular, pulmonary, gi and gu systems are negative, except as otherwise noted.      OBJECTIVE:                                                    /73 (BP Location: Right arm, Patient Position: Sitting, Cuff Size: Adult Large)  Pulse 75  Temp 97.6  F (36.4  C) (Tympanic)  Ht 5' 7\" (1.702 m)  Wt 214 lb 6.4 oz (97.3 kg)  BMI 33.58 kg/m2 Body mass index is 33.58 kg/(m^2).   GENERAL: healthy, alert, well nourished, well hydrated, no distress  HENT: ear canals- normal; TMs- normal; " Nose- normal; Mouth- no ulcers, no lesions  NECK: no tenderness, no adenopathy, no asymmetry, no masses, no stiffness; thyroid- normal to palpation  RESP: lungs clear to auscultation - no rales, no rhonchi, no wheezes  CV: regular rates and rhythm, normal S1 S2, no S3 or S4 and no murmur, no click or rub -  ABDOMEN: soft, no tenderness, no  hepatosplenomegaly, no masses, normal bowel sounds  Neck has early kyphosis but full rom     ASSESSMENT/PLAN:                                                      (M54.2) Cervicalgia  (primary encounter diagnosis)    Plan: XR Cervical Spine 2/3 Views, TITUS PT, HAND, AND         CHIROPRACTIC REFERRAL    Neck stretching hand out given, stresd importance of pposture, which he admits is poor.  Vit d supplement  2000iu daily    (M54.2) Cervicalgia  (primary encounter diagnosis)  Plan: XR Cervical Spine 2/3 Views, TITUS PT, HAND, AND         CHIROPRACTIC REFERRAL      (G47.01) Insomnia due to medical condition - neck painand anxiety.    Patient Instructions     Do neck stretching before bed,  Try heat.  Use melatonin to help get to sleep  Try the listed self relaxation for insomnia exercises  If not improving see physical therapists.    Watch posture  Keep shoulders back    Relaxation technique 1: Breathing meditation for stress relief  With its focus on full, cleansing breaths, deep breathing is a simple, yet powerful, relaxation technique. It s easy to learn, can be practiced almost anywhere, and provides a quick way to get your stress levels in check. Deep breathing is the cornerstone of many other relaxation practices, too, and can be combined with other relaxing elements such as aromatherapy and music. All you really need is a few minutes and a place to stretch out.  Practicing deep breathing meditation  The key to deep breathing is to breathe deeply from the abdomen, getting as much fresh air as possible in your lungs. When you take deep breaths from the abdomen, rather than shallow  breaths from your upper chest, you inhale more oxygen. The more oxygen you get, the less tense, short of breath, and anxious you feel.  Sit comfortably with your back straight. Put one hand on your chest and the other on your stomach.   Breathe in through your nose. The hand on your stomach should rise. The hand on your chest should move very little.   Exhale through your mouth, pushing out as much air as you can while jackie your abdominal muscles. The hand on your stomach should move in as you exhale, but your other hand should move very little.   Continue to breathe in through your nose and out through your mouth. Try to inhale enough so that your lower abdomen rises and falls. Count slowly as you exhale.   If you find it difficult breathing from your abdomen while sitting up, try lying on the floor. Put a small book on your stomach, and try to breathe so that the book rises as you inhale and falls as you exhale.   Relaxation technique 2: Progressive muscle relaxation for stress relief  Progressive muscle relaxation involves a two-step process in which you systematically tense and relax different muscle groups in the body.  With regular practice, progressive muscle relaxation gives you an intimate familiarity with what tension--as well as complete relaxation--feels like in different parts of the body. This awareness helps you spot and counteract the first signs of the muscular tension that accompanies stress. And as your body relaxes, so will your mind. You can combine deep breathing with progressive muscle relaxation for an additional level of stress relief.  Practicing progressive muscle relaxation  Before practicing Progressive Muscle Relaxation, consult with your doctor if you have a history of muscle spasms, back problems, or other serious injuries that may be aggravated by tensing muscles.  Most progressive muscle relaxation practitioners start at the feet and work their way up to the face. For a  sequence of muscle groups to follow, see the box below.  Loosen your clothing, take off your shoes, and get comfortable.   Take a few minutes to relax, breathing in and out in slow, deep breaths.   When you re relaxed and ready to start, shift your attention to your right foot. Take a moment to focus on the way it feels.   Slowly tense the muscles in your right foot, squeezing as tightly as you can. Hold for a count of 10.   Relax your right foot. Focus on the tension flowing away and the way your foot feels as it becomes limp and loose.   Stay in this relaxed state for a moment, breathing deeply and slowly.   When you re ready, shift your attention to your left foot. Follow the same sequence of muscle tension and release.   Move slowly up through your body, jackie and relaxing the muscle groups as you go.   It may take some practice at first, but try not to tense muscles other than those intended.   Progressive Muscle Relaxation Sequence  The most popular sequence runs as follows:  1. Right foot*   2. Left foot   3. Right calf   4. Left calf   5. Right thigh  6. Left thigh   7. Hips and buttocks   8. Stomach   9. Chest   10. Back  11. Right arm and hand   12. Left arm and hand   13. Neck and shoulders   14. Face    * If you are left-handed you may want to begin with your left foot instead.  Relaxation technique 3: Body scan meditation for stress relief  A body scan is similar to progressive muscle relaxation except, instead of tensing and relaxing muscles, you simply focus on the sensations in each part of your body.   Practicing body scan meditation  Lie on your back, legs uncrossed, arms relaxed at your sides, eyes open or closed. Focus on your breathing , allowing your stomach to rise as you inhale and fall as you exhale. Breathe deeply for about two minutes, until you start to feel comfortable and relaxed.   Turn your focus to the toes of your right foot. Notice any sensations you feel while continuing to  also focus on your breathing. Imagine each deep breath flowing to your toes. Remain focused on this area for one to two minutes.   Move your focus to the sole of your right foot. Tune in to any sensations you feel in that part of your body and imagine each breath flowing from the sole of your foot. After one or two minutes, move your focus to your right ankle and repeat. Move to your calf, knee, thigh, hip, and then repeat the sequence for your left leg. From there, move up the torso, through the lower back and abdomen, the upper back and chest, and the shoulders. Pay close attention to any area of the body that causes you pain or discomfort.   Move your focus to the fingers on your right hand and then move up to the wrist, forearm, elbow, upper arm, and shoulder. Repeat for your left arm. Then move through the neck and throat, and finally all the regions of your face, the back of the head, and the top of the head. Pay close attention to your jaw, chin, lips, tongue, nose, cheeks, eyes, forehead, temples and scalp. When you reach the very top of your head, let your breath reach out beyond your body and imagine yourself hovering above yourself.   After completing the body scan, relax for a while in silence and stillness, noting how your body feels. Then open your eyes slowly. Take a moment to stretch, if necessary.   For a guided body scan meditation, see the Resources section below.  Relaxation technique 4: Mindfulness for stress relief  Mindfulness is the ability to remain aware of how you re feeling right now, your  [id number]  experience--both internal and external. Thinking about the past--blaming and judging yourself--or worrying about the future can often lead to a degree of stress that is overwhelming. But by staying calm and focused in the present moment, you can bring your nervous system back into balance. Mindfulness can be applied to activities such as walking, exercising, eating, or  meditation.  Meditations that cultivate mindfulness have long been used to reduce overwhelming stress. Some of these meditations bring you into the present by focusing your attention on a single repetitive action, such as your breathing, a few repeated words, or flickering light from a candle. Other forms of mindfulness meditation encourage you to follow and then release internal thoughts or sensations.  Practicing mindfulness meditation  Key points in mindfulness mediation are:  A quiet environment. Choose a secluded place in your home, office, garden, place of Muslim, or in the great outdoors where you can relax without distractions or interruptions.   A comfortable position. Get comfortable, but avoid lying down as this may lead to you falling asleep. Sit up with your spine straight, either in a chair or on the floor. You can also try a cross-legged or chucho position.   A point of focus. This point can be internal - a feeling or imaginary scene - or something external - a flame or meaningful word or phrase that you repeat it throughout your session. You may meditate with eyes open or closed. Also choose to focus on an object in your surroundings to enhance your concentration, or alternately, you can close your eyes.   An observant, noncritical attitude. Don t worry about distracting thoughts that go through your mind or about how well you re doing. If thoughts intrude during your relaxation session, don t fight them. Instead, gently turn your attention back to your point of focus.   Relaxation technique 5: Visualization meditation for stress relief  Visualization, or guided imagery, is a variation on traditional meditation that requires you to employ not only your visual sense, but also your sense of taste, touch, smell, and sound. When used as a relaxation technique, visualization involves imagining a scene in which you feel at peace, free to let go of all tension and anxiety.  Choose whatever setting is most  calming to you, whether it s a tropical beach, a favorite childhood spot, or a quiet [place]ed [place]. You can do this visualization exercise on your own in silence, while listening to soothing music, or with a therapist (or an audio recording of a therapist) guiding you through the imagery. To help you employ your sense of hearing you can use a sound machine or download sounds that match your chosen setting--the sound of ocean waves if you ve chosen a beach, for example.  Practicing visualization  Find a quiet, relaxed place. Beginners sometimes fall asleep during a visualization meditation, so you might try sitting up or standing.  Close your eyes and let your worries drift away. Imagine your restful place. Picture it as vividly as you can--everything you can see, hear, smell, and feel. Visualization works best if you incorporate as many sensory details as possible, using at least three of your senses. When visualizing, choose imagery that appeals to you; don t select images because someone else suggests them, or because you think they should be appealing. Let your own images come up and work for you.  If you are thinking about a dock on a quiet lake, for example:   Walk slowly around the dock and notice the colors and textures around you.   Spend some time exploring each of your senses.   See the sun setting over the water.   Hear the birds singing.   Smell the pine trees.   Feel the cool water on your bare feet.   Taste the fresh, clean air.   Enjoy the feeling of deep relaxation that envelopes you as you slowly explore your restful place. When you are ready, gently open your eyes and come back to the present.  Don't worry if you sometimes zone out or lose track of where you are during a guided imagery session. This is normal. You may also experience feelings of stiffness or heaviness in your limbs, minor, involuntary muscle-movements, or even cough or yawn. Again, these are normal responses.   Relaxation technique  6: Yoga and rik chi for stress relief  Yoga involves a series of both moving and stationary poses, combined with deep breathing. As well as reducing anxiety and stress, yoga can also improve flexibility, strength, balance, and stamina. Practiced regularly, it can also strengthen the relaxation response in your daily life. Since injuries can happen when yoga is practiced incorrectly, it s best to learn by attending group classes, hiring a private teacher, or at least following video instructions.  What type of yoga is best for stress?  Although almost all yoga classes end in a relaxation pose, classes that emphasize slow, steady movement, deep breathing, and gentle stretching are best for stress relief.  Satyananda is a traditional form of yoga. It features gentle poses, deep relaxation, and meditation, making it suitable for beginners as well as anyone primarily looking for stress reduction.   Hatha yoga is also reasonably gentle way to relieve stress and is suitable for beginners. Alternately, look for labels like gentle, for stress relief, or for beginners when selecting a yoga class.   Power yoga, with its intense poses and focus on fitness, is better suited to those looking for stimulation as well as relaxation.   If you re unsure whether a specific yoga class is appropriate for stress relief, call the studio or ask the teacher.  Rik chi  If you ve ever seen a group of people in the park slowly moving in synch, you ve probably witnessed rik chi. Rik chi is a self-paced, non-competitive series of slow, flowing body movements. These movements emphasize concentration, relaxation, and the conscious circulation of vital energy throughout the body. Though rik chi has its roots in martial arts, today it is primarily practiced as a way of calming the mind, conditioning the body, and reducing stress. As in meditation, rik chi practitioners focus on their breathing and keeping their attention in the present moment.  Rik  chi is a safe, low-impact option for people of all ages and levels of fitness, including older adults and those recovering from injuries. Like yoga, once you ve learned the basics of cary chi or qi gong, you can practice alone or with others, tailoring your sessions as you see fit.   Making relaxation techniques a part of your life  The best way to start and maintain a relaxation practice is to incorporate it into your daily routine. Between work, family, school, and other commitments, though, it can be tough for many people to find the time. Fortunately, many of the techniques can be practiced while you re doing other things.  Rhythmic exercise as a mindfulness relaxation technique  Rhythmic exercise--such as running, walking, rowing, or cycling--is most effective at relieving stress when performed with relaxation in mind. As with meditation, mindfulness requires being fully engaged in the present moment, focusing your mind on how your body feels right now. As you exercise, focus on the physicality of your body s movement and how your breathing complements that movement. If your mind wanders to other thoughts, gently return to focusing on your breathing and movement.  If walking or running, for example, focus on each step--the sensation of your feet touching the ground, the rhythm of your breath while moving, and the feeling of the wind against your face.  Tips for fitting relaxation techniques into your life  If possible, schedule a set time to practice each day. Set aside one or two periods each day. You may find that it s easier to stick with your practice if you do it first thing in the morning, before other tasks and responsibilities get in the way.   Practice relaxation techniques while you re doing other things. Meditate while commuting to work on a bus or train, or waiting for a dentist appointment. Try deep breathing while you re doing housework or mowing the lawn. Mindfulness walking can be done while  exercising your dog, walking to your car, or climbing the stairs at work instead of using the elevator. Once you ve learned techniques such as cary chi, you can practice them in your office or in the park at lunchtime.   If you exercise, improve the relaxation benefits by adopting mindfulness. Instead of zoning out or staring at a TV as you exercise, try focusing your attention on your body. If you re resistance training, for example, focus on coordinating your breathing with your movements and pay attention to how your body feels as you raise and lower the weights.   Avoid practicing when you re sleepy. These techniques can relax you so much that they can make you very sleepy, especially if it s close to bedtime. You will get the most benefit if you practice when you re fully awake and alert. Do not practice after eating a heavy meal or while using drugs, tobacco, or alcohol.   Expect ups and downs. Don t be discouraged if you skip a few days or even a few weeks. It happens. Just get started again and slowly build up to your old momentum.             General recommendations for sleep problems (Insomnia)  Allow 2-4 weeks to see results   Establish a regular sleep schedule   Go to bed at same time each night   Get up at same time each day   Avoid sleeping-in on Sunday morning   Cut down time in bed (if not asleep, get up)   Avoid trying to force yourself to sleep   Use your bed only for sleep and sex   Do not read or watch Television in bed   Make the bedroom comfortable   Keep temperature in your bedroom comfortable   Keep bedroom quiet when sleeping   Consider ear plugs (silicon)   Keep bedroom dark enough   Use dark blinds or wear an eye mask if needed   Relax at bedtime   Relax each muscle group individually   Begin with your feet   Work toward your head   Deal with your worries before bedtime   Set aside a worry time for 30 minutes earlier   Listen to relaxation tapes   Classical Music or Nature sounds   Imagine  a tranquil scene (e.g. waterfall or beach)   Back Massage   Gentle 5-minute back rub prior to bedtime   Perform measures to make you tired at bedtime   Get regular Exercise each day (6 hours before bedtime)   Take medications only as directed   Eat a light bedtime snack or warm drink   Warm milk   Warm herbal tea (non-caffeinated)   Special Therapy Measures   Sleep Restriction Therapy   Limit time in bed for 15 minutes more than sleep   Do not set limit under 4 hours   Increase time by 15 minutes per effective sleep trial   Effective sleep suggests >90% of bedtime asleep   Stimulus Control   Do not lie awake for more than 30 minutes   Get out of bed and perform a quiet activity   Return to bed when sleepy   Repeat as many times per night as needed   No Napping during day   Things to avoid   Do not Exercise just before bedtime   No overstimulating activities just before bed   No competitive games before bedtime   No exciting Television programs before bedtime   Avoid caffeine after lunchtime   Avoid chocolate   Avoid coffee, tea, or soda   Do not use alcohol to induce sleep (worsens Insomnia)   Do not take someone else's sleeping pills   Do not look at the clock when awakening   Do not turn on light when getting up to use bathroom   Read the book Say God Night To Insomnia             reports that he has never smoked. He has never used smokeless tobacco.        Weight management plan: Discussed healthy diet and exercise guidelines and patient will follow up in 6 months in clinic to re-evaluate.      [facility]

## 2017-09-11 NOTE — NURSING NOTE
"Chief Complaint   Patient presents with     Sleep Problem       Initial Temp 97.6  F (36.4  C) (Tympanic)  Ht 5' 7\" (1.702 m)  Wt 214 lb 6.4 oz (97.3 kg)  BMI 33.58 kg/m2 Estimated body mass index is 33.58 kg/(m^2) as calculated from the following:    Height as of this encounter: 5' 7\" (1.702 m).    Weight as of this encounter: 214 lb 6.4 oz (97.3 kg).  Medication Reconciliation: complete   Hawa Cano CMA  "

## 2018-01-04 ENCOUNTER — OFFICE VISIT (OUTPATIENT)
Dept: FAMILY MEDICINE | Facility: CLINIC | Age: 28
End: 2018-01-04
Payer: COMMERCIAL

## 2018-01-04 VITALS
SYSTOLIC BLOOD PRESSURE: 121 MMHG | OXYGEN SATURATION: 99 % | TEMPERATURE: 97.5 F | DIASTOLIC BLOOD PRESSURE: 78 MMHG | WEIGHT: 215.4 LBS | HEART RATE: 82 BPM | HEIGHT: 67 IN | BODY MASS INDEX: 33.81 KG/M2

## 2018-01-04 DIAGNOSIS — H66.001 ACUTE SUPPURATIVE OTITIS MEDIA OF RIGHT EAR WITHOUT SPONTANEOUS RUPTURE OF TYMPANIC MEMBRANE, RECURRENCE NOT SPECIFIED: Primary | ICD-10-CM

## 2018-01-04 PROCEDURE — 99213 OFFICE O/P EST LOW 20 MIN: CPT | Performed by: FAMILY MEDICINE

## 2018-01-04 RX ORDER — AZITHROMYCIN 250 MG/1
TABLET, FILM COATED ORAL
Qty: 6 TABLET | Refills: 0 | Status: SHIPPED | OUTPATIENT
Start: 2018-01-04 | End: 2018-03-22

## 2018-01-04 NOTE — NURSING NOTE
"Chief Complaint   Patient presents with     URI       Initial /78 (BP Location: Right arm, Patient Position: Sitting, Cuff Size: Adult Large)  Pulse 82  Temp 97.5  F (36.4  C) (Tympanic)  Ht 5' 7\" (1.702 m)  Wt 215 lb 6.4 oz (97.7 kg)  SpO2 99%  BMI 33.74 kg/m2 Estimated body mass index is 33.74 kg/(m^2) as calculated from the following:    Height as of this encounter: 5' 7\" (1.702 m).    Weight as of this encounter: 215 lb 6.4 oz (97.7 kg).  Medication Reconciliation: complete   Hawa Cano CMA  "

## 2018-01-04 NOTE — PROGRESS NOTES
"SUBJECTIVE:                                                    Sergo Cabrera is a 27 year old male who presents to clinic today for the following health issues:    ENT Symptoms             Symptoms: cc Present Absent Comment   Fever/Chills   x    Fatigue  x     Muscle Aches   x    Eye Irritation   x    Sneezing  x     Nasal Chapincito/Drg  x     Sinus Pressure/Pain  x     Loss of smell   x    Dental pain   x    Sore Throat   x    Swollen Glands   x    Ear Pain/Fullness x x  Feels a little pressure   Cough x x     Wheeze   x    Chest Pain  x  Chest heaviness. No pain   Shortness of breath   x    Rash   x    Other  x  Voice is raspy     Symptom duration:  Has been feeling sick since last thursday   Symptom severity:  moderate   Treatments tried:  dayquil, Nyquil, musinex   Contacts:  Youngest brother was sick       Problem list and histories reviewed & adjusted, as indicated.  Additional history:     Patient Active Problem List   Diagnosis   (none) - all problems resolved or deleted     Past Surgical History:   Procedure Laterality Date     EYE SURGERY         Social History   Substance Use Topics     Smoking status: Never Smoker     Smokeless tobacco: Never Used     Alcohol use Yes      Comment: 2-3 drinks per month     Family History   Problem Relation Age of Onset     DIABETES Mother      HEART DISEASE Mother      HEART DISEASE Maternal Grandfather            ROS:  Constitutional, HEENT, cardiovascular, pulmonary, gi and gu systems are negative, except as otherwise noted.      OBJECTIVE:                                                    /78 (BP Location: Right arm, Patient Position: Sitting, Cuff Size: Adult Large)  Pulse 82  Temp 97.5  F (36.4  C) (Tympanic)  Ht 5' 7\" (1.702 m)  Wt 215 lb 6.4 oz (97.7 kg)  SpO2 99%  BMI 33.74 kg/m2 Body mass index is 33.74 kg/(m^2).   GENERAL: healthy, alert, well nourished, well hydrated, no distress  HENT: ear canals- normal;right ear red and bulgingNose- normal; Mouth- " no ulcers, no lesions  NECK: no tenderness, no adenopathy, no asymmetry, no masses, no stiffness; thyroid- normal to palpation  RESP: lungs clear to auscultation - no rales, no rhonchi, no wheezes  CV: regular rates and rhythm, normal S1 S2, no S3 or S4 and no murmur, no click or rub -  ABDOMEN: soft, no tenderness, no  hepatosplenomegaly, no masses, normal bowel sounds       ASSESSMENT/PLAN:                                                      (H66.001) Acute suppurative otitis media of right ear without spontaneous rupture of tympanic membrane, recurrence not specified  (primary encounter diagnosis)  Plan: azithromycin (ZITHROMAX) 250 MG tablet   return to clinic or call if unimproved in 3-4 days sooner if worse.     reports that he has never smoked. He has never used smokeless tobacco.    Weight management plan: Discussed healthy diet and exercise guidelines and patient will follow up in 6 months in clinic to re-evaluate.    [facility]

## 2018-01-04 NOTE — MR AVS SNAPSHOT
"              After Visit Summary   2018    Sergo Cabrera    MRN: [id number]           Patient Information     Date Of Birth          1990        Visit Information        Provider Department      2018 9:20 AM Miguel Guillaume MD [facility]        Today's Diagnoses     Acute suppurative otitis media of right ear without spontaneous rupture of tympanic membrane, recurrence not specified    -  1       Follow-ups after your visit        Who to contact     Normal or non-critical lab and imaging results will be communicated to you by Netpulsehart, letter or phone within 4 business days after the clinic has received the results. If you do not hear from us within 7 days, please contact the clinic through Netpulsehart or phone. If you have a critical or abnormal lab result, we will notify you by phone as soon as possible.  Submit refill requests through Yuuguu or call your pharmacy and they will forward the refill request to us. Please allow 3 business days for your refill to be completed.          If you need to speak with a  for additional information , please call: [phone]             Additional Information About Your Visit        Yuuguu Information     Yuuguu lets you send messages to your doctor, view your test results, renew your prescriptions, schedule appointments and more. To sign up, go to [email or web address] . Click on \"Log in\" on the left side of the screen, which will take you to the Welcome page. Then click on \"Sign up Now\" on the right side of the page.     You will be asked to enter the access code listed below, as well as some personal information. Please follow the directions to create your username and password.     Your access code is: [email or web address]  Expires: 2018  3:24 PM     Your access code will  in 90 days. If you need help or a new code, please call your [facility] or [phone].        Care EveryWhere ID     This is your Care EveryWhere ID. " "This could be used by other organizations to access your [place] medical records  [id number]        Your Vitals Were     Pulse Temperature Height Pulse Oximetry BMI (Body Mass Index)       82 97.5  F (36.4  C) (Tympanic) 5' 7\" (1.702 m) 99% 33.74 kg/m2        Blood Pressure from Last 3 Encounters:   01/04/18 121/78   09/11/17 115/73   06/19/17 110/80    Weight from Last 3 Encounters:   01/04/18 215 lb 6.4 oz (97.7 kg)   09/11/17 214 lb 6.4 oz (97.3 kg)   06/19/17 205 lb 6.4 oz (93.2 kg)              Today, you had the following     No orders found for display         Today's Medication Changes          These changes are accurate as of: 1/4/18 11:59 PM.  If you have any questions, ask your nurse or doctor.               Start taking these medicines.        Dose/Directions    azithromycin 250 MG tablet   Commonly known as:  ZITHROMAX   Used for:  Acute suppurative otitis media of right ear without spontaneous rupture of tympanic membrane, recurrence not specified   Started by:  Miguel Guillaume MD        Two tablets first day, then one tablet daily for four days.   Quantity:  6 tablet   Refills:  0            Where to get your medicines      These medications were sent to [id number] - [place], [place] - [address]  [address] , [facility] [address]    Hours:  test fax successfully sent 10/22/03 kr Phone:  [phone]     azithromycin 250 MG tablet                Primary Care Provider Office Phone # Fax #    Miguel Guillaume [phone]       [facility] [address] [facility] [address]        Equal Access to Services     KEVEN MORALES AH: Hadii felipe coughlin Soian, waaxda luqadaha, qaybta kaalmada adezhaneyada, ronnell crawford. So [facility] [phone].    ATENCIÓN: Si habla español, tiene a masters disposición servicios gratuitos de asistencia lingüística. Llame al [phone].    We comply with applicable federal civil rights laws " and [place] laws. We do not discriminate on the basis of race, color, national origin, age, disability, sex, sexual orientation, or gender identity.            Thank you!     Thank you for choosing [facility]  for your care. Our goal is always to provide you with excellent care. Hearing back from our patients is one way we can continue to improve our services. Please take a few minutes to complete the written survey that you may receive in the mail after your visit with us. Thank you!             Your Updated Medication List - Protect others around you: Learn how to safely use, store and throw away your medicines at www.disposemymeds.org.          This list is accurate as of: 1/4/18 11:59 PM.  Always use your most recent med list.                   Brand Name Dispense Instructions for use Diagnosis    albuterol 108 (90 BASE) MCG/ACT Inhaler    PROAIR HFA/PROVENTIL HFA/VENTOLIN HFA    1 Inhaler    Inhale 2 puffs into the lungs every 6 hours as needed for shortness of breath / dyspnea or wheezing    Acute bronchitis due to Mycoplasma pneumoniae       azithromycin 250 MG tablet    ZITHROMAX    6 tablet    Two tablets first day, then one tablet daily for four days.    Acute suppurative otitis media of right ear without spontaneous rupture of tympanic membrane, recurrence not specified       Multi-vitamin Tabs tablet      Take 1 tablet by mouth daily        NO ACTIVE MEDICATIONS

## 2018-03-22 ENCOUNTER — OFFICE VISIT (OUTPATIENT)
Dept: FAMILY MEDICINE | Facility: CLINIC | Age: 28
End: 2018-03-22
Payer: COMMERCIAL

## 2018-03-22 VITALS
OXYGEN SATURATION: 97 % | SYSTOLIC BLOOD PRESSURE: 122 MMHG | WEIGHT: 217.4 LBS | BODY MASS INDEX: 34.05 KG/M2 | TEMPERATURE: 97 F | HEART RATE: 83 BPM | DIASTOLIC BLOOD PRESSURE: 74 MMHG

## 2018-03-22 DIAGNOSIS — J06.9 VIRAL URI WITH COUGH: Primary | ICD-10-CM

## 2018-03-22 PROCEDURE — 99213 OFFICE O/P EST LOW 20 MIN: CPT | Performed by: PHYSICIAN ASSISTANT

## 2018-03-22 RX ORDER — ALBUTEROL SULFATE 90 UG/1
2 AEROSOL, METERED RESPIRATORY (INHALATION) EVERY 6 HOURS PRN
Qty: 1 INHALER | Refills: 1 | Status: SHIPPED | OUTPATIENT
Start: 2018-03-22 | End: 2018-05-17

## 2018-03-22 NOTE — MR AVS SNAPSHOT
"              After Visit Summary   3/22/2018    Sergo Cabrera    MRN: [id number]           Patient Information     Date Of Birth          1990        Visit Information        Provider Department      3/22/2018 7:40 AM Jesusita Medina PA-C [facility]        Today's Diagnoses     Viral URI with cough    -  1      Care Instructions    Have plenty of rest and fluids  Humidified air can be very helpful with cough  Use inhaler morning and night during this illness  Continue decongestant/expectorant  Use nasal spray Afrin OTC for 3-4 days in each nostril for congestion  Follow up if worsening symptoms or if not improving  Use debrox OTC softener in right ear canal to soften wax          Follow-ups after your visit        Who to contact     Normal or non-critical lab and imaging results will be communicated to you by PlanetEyehart, letter or phone within 4 business days after the clinic has received the results. If you do not hear from us within 7 days, please contact the clinic through PlanetEyehart or phone. If you have a critical or abnormal lab result, we will notify you by phone as soon as possible.  Submit refill requests through [employer] or call your pharmacy and they will forward the refill request to us. Please allow 3 business days for your refill to be completed.          If you need to speak with a  for additional information , please call: [phone]             Additional Information About Your Visit        [employer] Information     [employer] lets you send messages to your doctor, view your test results, renew your prescriptions, schedule appointments and more. To sign up, go to [email or web address] . Click on \"Log in\" on the left side of the screen, which will take you to the Welcome page. Then click on \"Sign up Now\" on the right side of the page.     You will be asked to enter the access code listed below, as well as some personal information. Please follow the directions to create " your username and password.     Your access code is: [email or web address]  Expires: 2018  4:24 PM     Your access code will  in 90 days. If you need help or a new code, please call your [place] clinic or [phone].        Care EveryWhere ID     This is your Care EveryWhere ID. This could be used by other organizations to access your [place] medical records  [id number]        Your Vitals Were     Pulse Temperature Pulse Oximetry BMI (Body Mass Index)          83 97  F (36.1  C) (Tympanic) 97% 34.05 kg/m2         Blood Pressure from Last 3 Encounters:   18 122/74   18 121/78   17 115/73    Weight from Last 3 Encounters:   18 217 lb 6.4 oz (98.6 kg)   18 215 lb 6.4 oz (97.7 kg)   17 214 lb 6.4 oz (97.3 kg)              Today, you had the following     No orders found for display         Where to get your medicines      These medications were sent to [id number] - [place], [place] - [address]  [address], [facility] [address]    Hours:  test fax successfully sent 10/22/03 kr Phone:  [phone]     albuterol 108 (90 BASE) MCG/ACT Inhaler          Primary Care Provider Office Phone # Fax #    Miguel Guillaume [phone]       [facility] [address] [facility] [address]        Equal Access to Services     KEVEN MORALES AH: Hadii felipe powell hadasho Soian, waaxda luqadaha, qaybta kaalmada adeegyada, ronnell crawford. So [facility] [phone].    ATENCIÓN: Si habla español, tiene a masters disposición servicios gratuitos de asistencia lingüística. Llame al [phone].    We comply with applicable federal civil rights laws and [place] laws. We do not discriminate on the basis of race, color, national origin, age, disability, sex, sexual orientation, or gender identity.            Thank you!     Thank you for choosing [facility]  for your care. Our goal is always to provide you with  excellent care. Hearing back from our patients is one way we can continue to improve our services. Please take a few minutes to complete the written survey that you may receive in the mail after your visit with us. Thank you!             Your Updated Medication List - Protect others around you: Learn how to safely use, store and throw away your medicines at www.disposemymeds.org.          This list is accurate as of 3/22/18  8:07 AM.  Always use your most recent med list.                   Brand Name Dispense Instructions for use Diagnosis    albuterol 108 (90 BASE) MCG/ACT Inhaler    PROAIR HFA/PROVENTIL HFA/VENTOLIN HFA    1 Inhaler    Inhale 2 puffs into the lungs every 6 hours as needed for shortness of breath / dyspnea or wheezing    Viral URI with cough       Multi-vitamin Tabs tablet      Take 1 tablet by mouth daily        NO ACTIVE MEDICATIONS

## 2018-03-22 NOTE — PROGRESS NOTES
SUBJECTIVE:                                                    Sergo Cabrera is a 28 year old male who presents to clinic today for the following health issues:    ENT Symptoms             Symptoms: cc Present Absent Comment   Fever/Chills   x    Fatigue  x     Muscle Aches   x    Eye Irritation   x    Sneezing   x    Nasal Chap[place]ito/Drg  x  congestion   Sinus Pressure/Pain  x  mild   Loss of smell   x    Dental pain   x    Sore Throat  x  Sore, irritation, mild   Swollen Glands   x    Ear Pain/Fullness  x  Left ear   Cough x x  Dry, more at night   Wheeze   x    Chest Pain   x Chest tightness   Shortness of breath   x    Rash   x    Other  x  Headache yesterday, fine today     Symptom duration:  1 week   Symptom severity:  Moderate   Treatments tried:  Tylenol, antihistamine, inhaler with some relief, decongestant last night   Contacts:  None       Eating/drinking okay  Hasn't used inhaler in a couple days    Problem list and histories reviewed & adjusted, as indicated.  Additional history: none    ROS:  Other than noted above, general, HEENT, respiratory, cardiac, MS, and gastrointestinal systems are negative.     OBJECTIVE:                                                    /74 (BP Location: Right arm, Patient Position: Sitting, Cuff Size: Adult Regular)  Pulse 83  Temp 97  F (36.1  C) (Tympanic)  Wt 217 lb 6.4 oz (98.6 kg)  BMI 34.05 kg/m2 Body mass index is 34.05 kg/(m^2).   GENERAL: healthy, alert, well nourished, well hydrated, no distress  HENT: ear canals- normal left POSITIVE right canal cerumen impaction; TMs- POSITIVE right not visualized, left shows congestion/fluid no erythema; Nose- normal; Mouth- no ulcers, no lesions  NECK: no tenderness, no adenopathy, no asymmetry, no masses, no stiffness; thyroid- normal to palpation  RESP: lungs clear to auscultation - no rales, no rhonchi, no wheezes  CV: regular rates and rhythm, normal S1 S2, no S3 or S4 and no murmur, no click or rub -  ABDOMEN: soft,  no tenderness, no  hepatosplenomegaly, no masses, normal bowel sounds    Patient elected to not irrigate today and no strep test    Patient well appearing, breathing easily in clinic, speaking in full sentences easily, no signs of cyanosis or respiratory distress.      ASSESSMENT/PLAN:                                                      ASSESSMENT/PLAN:      ICD-10-CM    1. Viral URI with cough J06.9 albuterol (PROAIR HFA/PROVENTIL HFA/VENTOLIN HFA) 108 (90 BASE) MCG/ACT Inhaler    B97.89        Patient Instructions   Have plenty of rest and fluids  Humidified air can be very helpful with cough  Use inhaler morning and night during this illness  Continue decongestant/expectorant  Use nasal spray Afrin OTC for 3-4 days in each nostril for congestion  Follow up if worsening symptoms or if not improving  Use debrox OTC softener in right ear canal to soften wax     reports that he has never smoked. He has never used smokeless tobacco.    Jesusita Medina PA-C   [facility]

## 2018-03-22 NOTE — PATIENT INSTRUCTIONS
Have plenty of rest and fluids  Humidified air can be very helpful with cough  Use inhaler morning and night during this illness  Continue decongestant/expectorant  Use nasal spray Afrin OTC for 3-4 days in each nostril for congestion  Follow up if worsening symptoms or if not improving  Use debrox OTC softener in right ear canal to soften wax

## 2018-03-26 ENCOUNTER — OFFICE VISIT (OUTPATIENT)
Dept: FAMILY MEDICINE | Facility: CLINIC | Age: 28
End: 2018-03-26
Payer: COMMERCIAL

## 2018-03-26 VITALS
DIASTOLIC BLOOD PRESSURE: 70 MMHG | WEIGHT: 216 LBS | HEIGHT: 67 IN | SYSTOLIC BLOOD PRESSURE: 120 MMHG | BODY MASS INDEX: 33.9 KG/M2 | TEMPERATURE: 97.4 F | HEART RATE: 72 BPM

## 2018-03-26 DIAGNOSIS — H66.001 ACUTE SUPPURATIVE OTITIS MEDIA OF RIGHT EAR WITHOUT SPONTANEOUS RUPTURE OF TYMPANIC MEMBRANE, RECURRENCE NOT SPECIFIED: Primary | ICD-10-CM

## 2018-03-26 PROCEDURE — 99213 OFFICE O/P EST LOW 20 MIN: CPT | Performed by: PHYSICIAN ASSISTANT

## 2018-03-26 RX ORDER — AZITHROMYCIN 250 MG/1
TABLET, FILM COATED ORAL
Qty: 6 TABLET | Refills: 0 | Status: SHIPPED | OUTPATIENT
Start: 2018-03-26 | End: 2018-05-17

## 2018-03-26 NOTE — MR AVS SNAPSHOT
"              After Visit Summary   3/26/2018    Sergo Cabrera    MRN: [id number]           Patient Information     Date Of Birth          1990        Visit Information        Provider Department      3/26/2018 10:00 AM Frida Farris PA-C [facility]        Today's Diagnoses     Acute suppurative otitis media of right ear without spontaneous rupture of tympanic membrane, recurrence not specified    -  1       Follow-ups after your visit        Who to contact     Normal or non-critical lab and imaging results will be communicated to you by Lonely Sockhart, letter or phone within 4 business days after the clinic has received the results. If you do not hear from us within 7 days, please contact the clinic through Traitifyt or phone. If you have a critical or abnormal lab result, we will notify you by phone as soon as possible.  Submit refill requests through hhgregg or call your pharmacy and they will forward the refill request to us. Please allow 3 business days for your refill to be completed.          If you need to speak with a  for additional information , please call: [phone]             Additional Information About Your Visit        hhgregg Information     hhgregg lets you send messages to your doctor, view your test results, renew your prescriptions, schedule appointments and more. To sign up, go to [email or web address] . Click on \"Log in\" on the left side of the screen, which will take you to the Welcome page. Then click on \"Sign up Now\" on the right side of the page.     You will be asked to enter the access code listed below, as well as some personal information. Please follow the directions to create your username and password.     Your access code is: [email or web address]  Expires: 2018  4:24 PM     Your access code will  in 90 days. If you need help or a new code, please call your [facility] or [phone].        Care EveryWhere ID     This is your " "Care EveryWhere ID. This could be used by other organizations to access your [place] medical records  [id number]        Your Vitals Were     Pulse Temperature Height BMI (Body Mass Index)          72 97.4  F (36.3  C) (Tympanic) 5' 7\" (1.702 m) 33.83 kg/m2         Blood Pressure from Last 3 Encounters:   03/26/18 120/70   03/22/18 122/74   01/04/18 121/78    Weight from Last 3 Encounters:   03/26/18 216 lb (98 kg)   03/22/18 217 lb 6.4 oz (98.6 kg)   01/04/18 215 lb 6.4 oz (97.7 kg)              Today, you had the following     No orders found for display         Today's Medication Changes          These changes are accurate as of 3/26/18 10:52 AM.  If you have any questions, ask your nurse or doctor.               Start taking these medicines.        Dose/Directions    azithromycin 250 MG tablet   Commonly known as:  ZITHROMAX   Used for:  Acute suppurative otitis media of right ear without spontaneous rupture of tympanic membrane, recurrence not specified   Started by:  Frida Farris PA-C        Two tablets first day, then one tablet daily for four days.   Quantity:  6 tablet   Refills:  0            Where to get your medicines      These medications were sent to [id number] - [place], [place] - [address]  [address] , [facility] [address]    Hours:  test fax successfully sent 10/22/03 kr Phone:  [phone]     azithromycin 250 MG tablet                Primary Care Provider Office Phone # Fax #    Miguel Guillaume [phone]       [facility] [address] [facility] [address]        Equal Access to Services     VENUS MORALES AH: Hadii felipe Graham, wagigida luqadaha, qaybta kaalmada adealie, ronnell crawford. So [facility] [phone].    ATENCIÓN: Si habla español, tiene a masters disposición servicios gratuitos de asistencia lingüística. Llame al [phone].    We comply with applicable federal civil " rights laws and [place] laws. We do not discriminate on the basis of race, color, national origin, age, disability, sex, sexual orientation, or gender identity.            Thank you!     Thank you for choosing [facility]  for your care. Our goal is always to provide you with excellent care. Hearing back from our patients is one way we can continue to improve our services. Please take a few minutes to complete the written survey that you may receive in the mail after your visit with us. Thank you!             Your Updated Medication List - Protect others around you: Learn how to safely use, store and throw away your medicines at www.disposemymeds.org.          This list is accurate as of 3/26/18 10:52 AM.  Always use your most recent med list.                   Brand Name Dispense Instructions for use Diagnosis    albuterol 108 (90 BASE) MCG/ACT Inhaler    PROAIR HFA/PROVENTIL HFA/VENTOLIN HFA    1 Inhaler    Inhale 2 puffs into the lungs every 6 hours as needed for shortness of breath / dyspnea or wheezing    Viral URI with cough       azithromycin 250 MG tablet    ZITHROMAX    6 tablet    Two tablets first day, then one tablet daily for four days.    Acute suppurative otitis media of right ear without spontaneous rupture of tympanic membrane, recurrence not specified       Multi-vitamin Tabs tablet      Take 1 tablet by mouth daily

## 2018-03-26 NOTE — PROGRESS NOTES
SUBJECTIVE:   Sergo Cabrera is a 28 year old male who presents to clinic today for the following health issues:      ENT Symptoms             Symptoms: cc Present Absent Comment   Fever/Chills  x     Fatigue  x     Muscle Aches   x    Eye Irritation   x    Sneezing   x    Nasal [place]/Drg  x     Sinus Pressure/Pain   x    Loss of smell   x    Dental pain   x    Sore Throat   x    Swollen Glands   x    Ear Pain/Fullness  x  Right ear, irritation, plugged    Cough  x     Wheeze   x    Chest Pain   x    Shortness of breath   x    Rash   x    Other   x      Symptom duration:  was seen on 3/22 for congestion and cough, starting to feel better from that but the ear is bothering him    Symptom severity:  moderate    Treatments tried:  Mucinex, Debrox drops    Contacts:  None       Has tried debrox to loosen the wax  No fevers  Congestion has improved a little but still with ear pain - not sure if it's due to wax or something else      Problem list and histories reviewed & adjusted, as indicated.  Additional history: as documented    Current Outpatient Prescriptions   Medication Sig Dispense Refill     multivitamin, therapeutic with minerals (MULTI-VITAMIN) TABS Take 1 tablet by mouth daily       albuterol (PROAIR HFA/PROVENTIL HFA/VENTOLIN HFA) 108 (90 BASE) MCG/ACT Inhaler Inhale 2 puffs into the lungs every 6 hours as needed for shortness of breath / dyspnea or wheezing (Patient not taking: Reported on 3/26/2018) 1 Inhaler 1     Allergies   Allergen Reactions     Cephalosporins      BP Readings from Last 3 Encounters:   03/26/18 120/70   03/22/18 122/74   01/04/18 121/78    Wt Readings from Last 3 Encounters:   03/26/18 216 lb (98 kg)   03/22/18 217 lb 6.4 oz (98.6 kg)   01/04/18 215 lb 6.4 oz (97.7 kg)                    Reviewed and updated as needed this visit by clinical staff       Reviewed and updated as needed this visit by Provider         ROS:  Remainder of ROS obtained and found to be negative other than that  "which was documented above      OBJECTIVE:     /70  Pulse 72  Temp 97.4  F (36.3  C) (Tympanic)  Ht 5' 7\" (1.702 m)  Wt 216 lb (98 kg)  BMI 33.83 kg/m2  Body mass index is 33.83 kg/(m^2).  GENERAL: healthy, alert and no distress  EYES: Eyes grossly normal to inspection  HENT: ear canal and TM normal on left. Cerumen impaction on right. Some removed manually, remainder removed with irrigation. After removal TM visualized - noted to be erythematous and bulging, nose and mouth without ulcers or lesions  NECK: no adenopathy  RESP: lungs clear to auscultation - no rales, rhonchi or wheezes  CV: regular rates and rhythm, normal S1 S2, no S3 or S4 and no murmur, click or rub    Diagnostic Test Results:  none     ASSESSMENT/PLAN:     (H66.001) Acute suppurative otitis media of right ear without spontaneous rupture of tympanic membrane, recurrence not specified  (primary encounter diagnosis)  Comment: after removal, TM noted to be inflamed more than would be expected from simple irritation from wax and removal.   Plan: azithromycin (ZITHROMAX) 250 MG tablet                Frida Farris PA-C  [facility]    "

## 2018-03-26 NOTE — NURSING NOTE
"Chief Complaint   Patient presents with     Ear Problem       Initial /70  Pulse 72  Temp 97.4  F (36.3  C) (Tympanic)  Ht 5' 7\" (1.702 m)  Wt 216 lb (98 kg)  BMI 33.83 kg/m2 Estimated body mass index is 33.83 kg/(m^2) as calculated from the following:    Height as of this encounter: 5' 7\" (1.702 m).    Weight as of this encounter: 216 lb (98 kg).  Medication Reconciliation: complete     Cleveland Bradford CMA    "

## 2018-04-11 ENCOUNTER — OFFICE VISIT (OUTPATIENT)
Dept: OTOLARYNGOLOGY | Facility: CLINIC | Age: 28
End: 2018-04-11
Payer: COMMERCIAL

## 2018-04-11 VITALS
HEART RATE: 84 BPM | RESPIRATION RATE: 16 BRPM | HEIGHT: 67 IN | WEIGHT: 217.6 LBS | SYSTOLIC BLOOD PRESSURE: 128 MMHG | BODY MASS INDEX: 34.15 KG/M2 | OXYGEN SATURATION: 97 % | TEMPERATURE: 97.9 F | DIASTOLIC BLOOD PRESSURE: 79 MMHG

## 2018-04-11 DIAGNOSIS — R09.A2 GLOBUS SENSATION: Primary | ICD-10-CM

## 2018-04-11 PROCEDURE — 99203 OFFICE O/P NEW LOW 30 MIN: CPT | Performed by: OTOLARYNGOLOGY

## 2018-04-11 NOTE — MR AVS SNAPSHOT
After Visit Summary   4/11/2018    Sergo Cabrera    MRN: [id number]           Patient Information     Date Of Birth          1990        Visit Information        Provider Department      4/11/2018 9:30 AM Iglesia Joyce MD [facility]        Today's Diagnoses     Globus sensation    -  1      Care Instructions    General Scheduling Information  To schedule your CT/MRI scan, please contact [place] [facility] at [phone] [address] [place] [address]    To schedule your Surgery, please contact our Specialty Schedulers at [phone]    ENT Clinic Locations Clinic Hours Telephone Number     [facility] [place]  [address]  [place] [address]   Tuesday:       8:00am -- 4:00pm    Wednesday:  8:00am - 4:00pm   To schedule an appointment with   Dr. Joyce,   please contact our   Specialty Scheduling Department at:     [phone]       [facility]  [address]   [place] [address]   Friday:          8:00am - 4:00pm         Urgent Care Locations Clinic Hours Telephone Numbers     [facility] [place]  [address] [place] [address]     Monday-Friday:     11:00pm - 9:00pm    Saturday-Sunday:  9:00am - 5:00pm   [phone]     [facility]  [address]   [place] [address]     Monday-Friday:      5:00pm - 9:00pm     Saturday-Sunday:  9:00am - 5:00pm   [phone]                 Follow-ups after your visit        Who to contact     If you have questions or need follow up information about today's clinic visit or your schedule please contact [facility] directly at [phone].  Normal or non-critical lab and imaging results will be communicated to you by MyChart, letter or phone within 4 business days after the clinic has received the results. If you do not hear from us within 7 days, please contact the clinic through MyChart or phone. If you have a critical or abnormal lab result, we will notify you by phone  "as soon as possible.  Submit refill requests through [employer] or call your pharmacy and they will forward the refill request to us. Please allow 3 business days for your refill to be completed.          Additional Information About Your Visit        [employer] Information     [employer] lets you send messages to your doctor, view your test results, renew your prescriptions, schedule appointments and more. To sign up, go to [email or web address] . Click on \"Log in\" on the left side of the screen, which will take you to the Welcome page. Then click on \"Sign up Now\" on the right side of the page.     You will be asked to enter the access code listed below, as well as some personal information. Please follow the directions to create your username and password.     Your access code is: [email or web address]  Expires: 7/10/2018  9:50 AM     Your access code will  in 90 days. If you need help or a new code, please call your [place] clinic or [phone].        Care EveryWhere ID     This is your Care EveryWhere ID. This could be used by other organizations to access your [place] medical records  [id number]        Your Vitals Were     Pulse Temperature Respirations Height Pulse Oximetry BMI (Body Mass Index)    84 97.9  F (36.6  C) (Oral) 16 1.702 m (5' 7\") 97% 34.08 kg/m2       Blood Pressure from Last 3 Encounters:   18 128/79   18 120/70   18 122/74    Weight from Last 3 Encounters:   18 98.7 kg (217 lb 9.6 oz)   18 98 kg (216 lb)   18 98.6 kg (217 lb 6.4 oz)              Today, you had the following     No orders found for display       Primary Care Provider Office Phone # Fax #    Miguel Guillaume [phone]       [facility] [address] [address]        Equal Access to Services     KEVEN MORALES AH: Kyler Graham, tanya venegas, ronnell torres'aan ah. So [facility] " [phone].    ATENCIÓN: Si frederick da silva, tiene a masters disposición servicios gratuitos de asistencia lingüística. Debra brasher [phone].    We comply with applicable federal civil rights laws and [place] laws. We do not discriminate on the basis of race, color, national origin, age, disability, sex, sexual orientation, or gender identity.            Thank you!     Thank you for choosing [facility]  for your care. Our goal is always to provide you with excellent care. Hearing back from our patients is one way we can continue to improve our services. Please take a few minutes to complete the written survey that you may receive in the mail after your visit with us. Thank you!             Your Updated Medication List - Protect others around you: Learn how to safely use, store and throw away your medicines at www.disposemymeds.org.          This list is accurate as of 4/11/18  9:50 AM.  Always use your most recent med list.                   Brand Name Dispense Instructions for use Diagnosis    albuterol 108 (90 Base) MCG/ACT Inhaler    PROAIR HFA/PROVENTIL HFA/VENTOLIN HFA    1 Inhaler    Inhale 2 puffs into the lungs every 6 hours as needed for shortness of breath / dyspnea or wheezing    Viral URI with cough       azithromycin 250 MG tablet    ZITHROMAX    6 tablet    Two tablets first day, then one tablet daily for four days.    Acute suppurative otitis media of right ear without spontaneous rupture of tympanic membrane, recurrence not specified       Multi-vitamin Tabs tablet      Take 1 tablet by mouth daily

## 2018-04-11 NOTE — PROGRESS NOTES
Chief Complaint - foreign body sensation in throat    History of Present Illness - Sergo Cabrera is a 28 year old male who presents with a history of feeling like something is in the back of the throat since the last few weeks. The last couple days it has been better. Felt the back of nose and throat. Sort of felt like he had trouble breathing. Felt something was caught and had some troubles swallowing. Then went away. Not much for cough, it didn't help. Had similar symptoms one other time a year ago. They note a history of relux. He takes tums. Usually takes at night before bed.      Past Medical History -   - asthma    Current Medications -   Current Outpatient Prescriptions:      albuterol (PROAIR HFA/PROVENTIL HFA/VENTOLIN HFA) 108 (90 BASE) MCG/ACT Inhaler, Inhale 2 puffs into the lungs every 6 hours as needed for shortness of breath / dyspnea or wheezing, Disp: 1 Inhaler, Rfl: 1     multivitamin, therapeutic with minerals (MULTI-VITAMIN) TABS, Take 1 tablet by mouth daily, Disp: , Rfl:      azithromycin (ZITHROMAX) 250 MG tablet, Two tablets first day, then one tablet daily for four days. (Patient not taking: Reported on 4/11/2018), Disp: 6 tablet, Rfl: 0    Allergies -   Allergies   Allergen Reactions     Cephalosporins        Social History -   Social History     Social History     Marital status: Single     Spouse name: N/A     Number of children: 0     Years of education: 15+     Occupational History      Unemployed     Social History Main Topics     Smoking status: Never Smoker     Smokeless tobacco: Never Used     Alcohol use Yes      Comment: 2-3 drinks per month     Drug use: No     Sexual activity: Not Currently     Partners: Female, Male     Birth control/ protection: Condom     Other Topics Concern     Parent/Sibling W/ Cabg, Mi Or Angioplasty Before 65f 55m? No     Social History Narrative       Family History -   Family History   Problem Relation Age of Onset     DIABETES Mother      HEART  "DISEASE Mother      HEART DISEASE Maternal Grandfather    mother has similar throat issues. Has asthma.    Review of Systems - As per HPI and PMHx, otherwise 7 system review is negative.    Physical Exam  /79  Pulse 84  Temp 97.9  F (36.6  C) (Oral)  Resp 16  Ht 1.702 m (5' 7\")  Wt 98.7 kg (217 lb 9.6 oz)  SpO2 97%  BMI 34.08 kg/m2  General - The patient is in no distress. Alert and oriented to person and place, answers questions and cooperates with examination appropriately.   Voice and Breathing - The patient was breathing comfortably without the use of accessory muscles. There was no wheezing, stridor, or stertor.  The patients voice was clear and strong.  Eyes - Extraocular movements intact.  Sclera were not icteric or injected, conjunctiva were pink and moist.  Ears - canals clean and clear. Tympanic membranes intact. No effusions or infections.   Mouth - Examination of the oral cavity showed pink, healthy oral mucosa. No lesions or ulcerations noted.  The tongue was mobile and midline.  Throat - The walls of the oropharynx were smooth, symmetric, and had no lesions or ulcerations.  The tonsillar pillars and soft palate were symmetric.  The uvula was midline on elevation. No postnasal drainage. No exudate.   Nose - External contour is symmetric, no gross deflection or scars.  Nasal mucosa is pink and moist with no abnormal mucus.  The septum was midline and non-obstructive, turbinates of normal size and position.  No polyps, masses, or purulence noted on examination.  Neck -  Palpation of the occipital, submental, submandibular, internal jugular chain, and supraclavicular nodes did not demonstrate any abnormal lymph nodes or masses. No parotid masses. Palpation of the thyroid was soft and smooth, with no nodules or goiter appreciated.  The trachea was mobile and midline.  Neurologic - CN II-XII are grossly intact, no focal neurologic deficits.     A/P - Sergo Cabrera is a 28 year old male with " globus sensation but no evidence of infection, inflammation, or neoplasm on exam to explain the symptoms. This is better over the last few days. I think the most likely etiology is from a viral upper respiratory infection or laryngopharyngeal reflux. Since it is better and his cold is better he maybe had effects of that. Otherwise, he takes tums a lot, and his reflux may have been worse then. I advised him on diet and lifestyle changes and possibly trying pepcid or zantac instead of tums a lot.     Adult lifestyle changes to prevent LPR reviewed      Avoid eating and drinking within two to three hours prior to bedtime    Do not drink alcohol    Eat small meals and slowly    Limit problem foods:    o Caffeine  o Carbonated drinks  o Chocolate  o Peppermint  o Tomato  o Citrus fruits  o Fatty and fried foods      Lose weight    Quit smoking    Wear loose clothing      Dr. Iglesia Joyce  Otolaryngology  [facility]

## 2018-04-11 NOTE — PATIENT INSTRUCTIONS
General Scheduling Information  To schedule your CT/MRI scan, please contact [place] [facility] at [phone]   [address] [place] [address]    To schedule your Surgery, please contact our Specialty Schedulers at [phone]    ENT Clinic Locations Clinic Hours Telephone Number     [facility]  [address] [place] [address]   Tuesday:       8:00am -- 4:00pm    Wednesday:  8:00am - 4:00pm   To schedule an appointment with   Dr. Joyce,   please contact our   Specialty Scheduling Department at:     [phone]       [facility]  [address] [place] [address]   Friday:          8:00am - 4:00pm         Urgent Care Locations Clinic Hours Telephone Numbers     [facility]  [address] [address]     Monday-Friday:     11:00pm - 9:00pm    Saturday-Sunday:  9:00am - 5:00pm   [phone]     [facility]  [address] [place] [address]     Monday-Friday:      5:00pm - 9:00pm     Saturday-Sunday:  9:00am - 5:00pm   [phone]

## 2018-04-11 NOTE — LETTER
4/11/2018         RE: Sergo Cabrera  [address] [address]        Dear Colleague,    Thank you for referring your patient, Sergo Cabrera, to the [facility]. Please see a copy of my visit note below.    Chief Complaint - foreign body sensation in throat    History of Present Illness - Sergo Cabrera is a 28 year old male who presents with a history of feeling like something is in the back of the throat since the last few weeks. The last couple days it has been better. Felt the back of nose and throat. Sort of felt like he had trouble breathing. Felt something was caught and had some troubles swallowing. Then went away. Not much for cough, it didn't help. Had similar symptoms one other time a year ago. They note a history of relux. He takes tums. Usually takes at night before bed.      Past Medical History -   - asthma    Current Medications -   Current Outpatient Prescriptions:      albuterol (PROAIR HFA/PROVENTIL HFA/VENTOLIN HFA) 108 (90 BASE) MCG/ACT Inhaler, Inhale 2 puffs into the lungs every 6 hours as needed for shortness of breath / dyspnea or wheezing, Disp: 1 Inhaler, Rfl: 1     multivitamin, therapeutic with minerals (MULTI-VITAMIN) TABS, Take 1 tablet by mouth daily, Disp: , Rfl:      azithromycin (ZITHROMAX) 250 MG tablet, Two tablets first day, then one tablet daily for four days. (Patient not taking: Reported on 4/11/2018), Disp: 6 tablet, Rfl: 0    Allergies -   Allergies   Allergen Reactions     Cephalosporins        Social History -   Social History     Social History     Marital status: Single     Spouse name: N/A     Number of children: 0     Years of education: 15+     Occupational History      Unemployed     Social History Main Topics     Smoking status: Never Smoker     Smokeless tobacco: Never Used     Alcohol use Yes      Comment: 2-3 drinks per month     Drug use: No     Sexual activity: Not Currently     Partners: Female, Male     Birth control/  "protection: Condom     Other Topics Concern     Parent/Sibling W/ Cabg, Mi Or Angioplasty Before 65f 55m? No     Social History Narrative       Family History -   Family History   Problem Relation Age of Onset     DIABETES Mother      HEART DISEASE Mother      HEART DISEASE Maternal Grandfather    mother has similar throat issues. Has asthma.    Review of Systems - As per HPI and PMHx, otherwise 7 system review is negative.    Physical Exam  /79  Pulse 84  Temp 97.9  F (36.6  C) (Oral)  Resp 16  Ht 1.702 m (5' 7\")  Wt 98.7 kg (217 lb 9.6 oz)  SpO2 97%  BMI 34.08 kg/m2  General - The patient is in no distress. Alert and oriented to person and place, answers questions and cooperates with examination appropriately.   Voice and Breathing - The patient was breathing comfortably without the use of accessory muscles. There was no wheezing, stridor, or stertor.  The patients voice was clear and strong.  Eyes - Extraocular movements intact.  Sclera were not icteric or injected, conjunctiva were pink and moist.  Ears - canals clean and clear. Tympanic membranes intact. No effusions or infections.   Mouth - Examination of the oral cavity showed pink, healthy oral mucosa. No lesions or ulcerations noted.  The tongue was mobile and midline.  Throat - The walls of the oropharynx were smooth, symmetric, and had no lesions or ulcerations.  The tonsillar pillars and soft palate were symmetric.  The uvula was midline on elevation. No postnasal drainage. No exudate.   Nose - External contour is symmetric, no gross deflection or scars.  Nasal mucosa is pink and moist with no abnormal mucus.  The septum was midline and non-obstructive, turbinates of normal size and position.  No polyps, masses, or purulence noted on examination.  Neck -  Palpation of the occipital, submental, submandibular, internal jugular chain, and supraclavicular nodes did not demonstrate any abnormal lymph nodes or masses. No parotid masses. Palpation " of the thyroid was soft and smooth, with no nodules or goiter appreciated.  The trachea was mobile and midline.  Neurologic - CN II-XII are grossly intact, no focal neurologic deficits.     A/P - Sergo Cabrera is a 28 year old male with globus sensation but no evidence of infection, inflammation, or neoplasm on exam to explain the symptoms. This is better over the last few days. I think the most likely etiology is from a viral upper respiratory infection or laryngopharyngeal reflux. Since it is better and his cold is better he maybe had effects of that. Otherwise, he takes tums a lot, and his reflux may have been worse then. I advised him on diet and lifestyle changes and possibly trying pepcid or zantac instead of tums a lot.     Adult lifestyle changes to prevent LPR reviewed      Avoid eating and drinking within two to three hours prior to bedtime    Do not drink alcohol    Eat small meals and slowly    Limit problem foods:    o Caffeine  o Carbonated drinks  o Chocolate  o Peppermint  o Tomato  o Citrus fruits  o Fatty and fried foods      Lose weight    Quit smoking    Wear loose clothing      Dr. Iglesia Joyce  Otolaryngology  [facility]      Again, thank you for allowing me to participate in the care of your patient.        Sincerely,        Iglesia Joyce MD

## 2018-05-17 ENCOUNTER — OFFICE VISIT (OUTPATIENT)
Dept: FAMILY MEDICINE | Facility: CLINIC | Age: 28
End: 2018-05-17
Payer: COMMERCIAL

## 2018-05-17 VITALS
HEART RATE: 75 BPM | TEMPERATURE: 97.5 F | WEIGHT: 218.9 LBS | SYSTOLIC BLOOD PRESSURE: 120 MMHG | BODY MASS INDEX: 34.36 KG/M2 | HEIGHT: 67 IN | DIASTOLIC BLOOD PRESSURE: 74 MMHG

## 2018-05-17 DIAGNOSIS — Z11.3 SCREEN FOR STD (SEXUALLY TRANSMITTED DISEASE): Primary | ICD-10-CM

## 2018-05-17 LAB
HBV CORE AB SERPL QL IA: NONREACTIVE
HBV SURFACE AG SERPL QL IA: NONREACTIVE
HCV AB SERPL QL IA: NONREACTIVE
HIV 1+2 AB+HIV1 P24 AG SERPL QL IA: NONREACTIVE

## 2018-05-17 PROCEDURE — 99213 OFFICE O/P EST LOW 20 MIN: CPT | Performed by: PHYSICIAN ASSISTANT

## 2018-05-17 PROCEDURE — 86803 HEPATITIS C AB TEST: CPT | Performed by: PHYSICIAN ASSISTANT

## 2018-05-17 PROCEDURE — 36415 COLL VENOUS BLD VENIPUNCTURE: CPT | Performed by: PHYSICIAN ASSISTANT

## 2018-05-17 PROCEDURE — 87389 HIV-1 AG W/HIV-1&-2 AB AG IA: CPT | Performed by: PHYSICIAN ASSISTANT

## 2018-05-17 PROCEDURE — 86704 HEP B CORE ANTIBODY TOTAL: CPT | Performed by: PHYSICIAN ASSISTANT

## 2018-05-17 PROCEDURE — 87340 HEPATITIS B SURFACE AG IA: CPT | Performed by: PHYSICIAN ASSISTANT

## 2018-05-17 PROCEDURE — 87591 N.GONORRHOEAE DNA AMP PROB: CPT | Performed by: PHYSICIAN ASSISTANT

## 2018-05-17 PROCEDURE — 87491 CHLMYD TRACH DNA AMP PROBE: CPT | Performed by: PHYSICIAN ASSISTANT

## 2018-05-17 NOTE — PROGRESS NOTES
"  SUBJECTIVE:   Sergo Cabrera is a 28 year old male who presents to clinic today for the following health issues:      *  Patient would like to have STD screening done.  No new sexual partners  4 lifetime partners  Not currently sexually active, \"just doing due diligence\"  He declines cholesterol/glucose check  He denies any symptoms     Problem list and histories reviewed & adjusted, as indicated.  Additional history: none    Labs reviewed in EPIC    Reviewed and updated as needed this visit by clinical staff  Tobacco  Allergies  Meds  Problems  Med Hx  Surg Hx  Fam Hx  Soc Hx        Reviewed and updated as needed this visit by Provider  Tobacco  Allergies  Meds  Problems  Med Hx  Surg Hx  Fam Hx  Soc Hx          ROS:  Other than noted above, general, HEENT, respiratory, cardiac, MS, and gastrointestinal systems are negative.     OBJECTIVE:     /74 (BP Location: Right arm, Patient Position: Sitting, Cuff Size: Adult Regular)  Pulse 75  Temp 97.5  F (36.4  C) (Tympanic)  Ht 5' 7\" (1.702 m)  Wt 218 lb 14.4 oz (99.3 kg)  BMI 34.28 kg/m2  Body mass index is 34.28 kg/(m^2).  GENERAL: healthy, alert and no distress  RESP: lungs clear to auscultation - no rales, rhonchi or wheezes  CV: regular rate and rhythm, normal S1 S2, no S3 or S4, no murmur, click or rub, no peripheral edema and peripheral pulses strong    ASSESSMENT/PLAN:     ASSESSMENT/PLAN:      ICD-10-CM    1. Screen for STD (sexually transmitted disease) Z11.3 Neisseria gonorrhoeae PCR     Chlamydia trachomatis PCR     Hepatitis C antibody     HIV Antigen Antibody Combo     Hepatitis B core antibody     Hepatitis B surface antigen     Will check labs today.    Jesusita Medina PA-C  [facility]  "

## 2018-05-17 NOTE — MR AVS SNAPSHOT
"              After Visit Summary   2018    Sergo Cabrera    MRN: [id number]           Patient Information     Date Of Birth          1990        Visit Information        Provider Department      2018 8:00 AM Jesusita Medina PA-C [facility]        Today's Diagnoses     Screen for STD (sexually transmitted disease)    -  1       Follow-ups after your visit        Who to contact     Normal or non-critical lab and imaging results will be communicated to you by MyChart, letter or phone within 4 business days after the clinic has received the results. If you do not hear from us within 7 days, please contact the clinic through MyChart or phone. If you have a critical or abnormal lab result, we will notify you by phone as soon as possible.  Submit refill requests through [employer] or call your pharmacy and they will forward the refill request to us. Please allow 3 business days for your refill to be completed.          If you need to speak with a  for additional information , please call: [phone]             Additional Information About Your Visit        [employer] Information     [employer] lets you send messages to your doctor, view your test results, renew your prescriptions, schedule appointments and more. To sign up, go to [email or web address] . Click on \"Log in\" on the left side of the screen, which will take you to the Welcome page. Then click on \"Sign up Now\" on the right side of the page.     You will be asked to enter the access code listed below, as well as some personal information. Please follow the directions to create your username and password.     Your access code is: [email or web address]  Expires: 7/10/2018  9:50 AM     Your access code will  in 90 days. If you need help or a new code, please call your [place] clinic or [phone].        Care EveryWhere ID     This is your Care EveryWhere ID. This could be used by other organizations to access your [place] " "medical records  [id number]        Your Vitals Were     Pulse Temperature Height BMI (Body Mass Index)          75 97.5  F (36.4  C) (Tympanic) 5' 7\" (1.702 m) 34.28 kg/m2         Blood Pressure from Last 3 Encounters:   05/17/18 120/74   04/11/18 128/79   03/26/18 120/70    Weight from Last 3 Encounters:   05/17/18 218 lb 14.4 oz (99.3 kg)   04/11/18 217 lb 9.6 oz (98.7 kg)   03/26/18 216 lb (98 kg)              We Performed the Following     Chlamydia trachomatis PCR     Hepatitis B core antibody     Hepatitis B surface antigen     Hepatitis C antibody     HIV Antigen Antibody Combo     Neisseria gonorrhoeae PCR          Today's Medication Changes          These changes are accurate as of 5/17/18  8:20 AM.  If you have any questions, ask your nurse or doctor.               Stop taking these medicines if you haven't already. Please contact your care team if you have questions.     albuterol 108 (90 Base) MCG/ACT Inhaler   Commonly known as:  PROAIR HFA/PROVENTIL HFA/VENTOLIN HFA   Stopped by:  Jesusita Medina PA-C           azithromycin 250 MG tablet   Commonly known as:  ZITHROMAX   Stopped by:  Jesusita Medina PA-C                    Primary Care Provider Office Phone # Fax #    Miguel Guillaume [phone] [address] [address] [facility] [address]        Equal Access to Services     [facility]: Hadii aad ku hadasho Soomaali, waaxda luqadaha, qaybta kaalmada adeegyada, ronnell fountainin hayliyah sotelo . So [facility] [phone].    ATENCIÓN: Si habla español, tiene a masters disposición servicios gratuitos de asistencia lingüística. Llame al [phone].    We comply with applicable federal civil rights laws and [place] laws. We do not discriminate on the basis of race, color, national origin, age, disability, sex, sexual orientation, or gender identity.            Thank you!     Thank you for choosing [facility]  for your care. Our goal is always to provide you with " excellent care. Hearing back from our patients is one way we can continue to improve our services. Please take a few minutes to complete the written survey that you may receive in the mail after your visit with us. Thank you!             Your Updated Medication List - Protect others around you: Learn how to safely use, store and throw away your medicines at www.disposemymeds.org.          This list is accurate as of 5/17/18  8:20 AM.  Always use your most recent med list.                   Brand Name Dispense Instructions for use Diagnosis    Multi-vitamin Tabs tablet      Take 1 tablet by mouth daily

## 2018-05-18 LAB
C TRACH DNA SPEC QL NAA+PROBE: NEGATIVE
N GONORRHOEA DNA SPEC QL NAA+PROBE: NEGATIVE
SPECIMEN SOURCE: NORMAL
SPECIMEN SOURCE: NORMAL

## 2018-06-11 ENCOUNTER — OFFICE VISIT (OUTPATIENT)
Dept: FAMILY MEDICINE | Facility: CLINIC | Age: 28
End: 2018-06-11
Payer: COMMERCIAL

## 2018-06-11 VITALS
WEIGHT: 219 LBS | TEMPERATURE: 97 F | DIASTOLIC BLOOD PRESSURE: 76 MMHG | HEART RATE: 74 BPM | BODY MASS INDEX: 34.37 KG/M2 | SYSTOLIC BLOOD PRESSURE: 114 MMHG | OXYGEN SATURATION: 95 % | HEIGHT: 67 IN

## 2018-06-11 DIAGNOSIS — J20.9 ACUTE BRONCHITIS, UNSPECIFIED ORGANISM: Primary | ICD-10-CM

## 2018-06-11 PROCEDURE — 99213 OFFICE O/P EST LOW 20 MIN: CPT | Performed by: FAMILY MEDICINE

## 2018-06-11 RX ORDER — DOXYCYCLINE 100 MG/1
100 CAPSULE ORAL 2 TIMES DAILY
Qty: 20 CAPSULE | Refills: 0 | Status: SHIPPED | OUTPATIENT
Start: 2018-06-11 | End: 2019-12-26

## 2018-06-11 ASSESSMENT — PAIN SCALES - GENERAL: PAINLEVEL: MILD PAIN (2)

## 2018-06-11 NOTE — MR AVS SNAPSHOT
"              After Visit Summary   6/11/2018    Sergo Cabrera    MRN: [id number]           Patient Information     Date Of Birth          1990        Visit Information        Provider Department      6/11/2018 8:00 AM Miguel Guillaume MD [facility]        Today's Diagnoses     Acute bronchitis, unspecified organism    -  1       Follow-ups after your visit        Who to contact     Normal or non-critical lab and imaging results will be communicated to you by Kippthart, letter or phone within 4 business days after the clinic has received the results. If you do not hear from us within 7 days, please contact the clinic through Kippthart or phone. If you have a critical or abnormal lab result, we will notify you by phone as soon as possible.  Submit refill requests through iFit or call your pharmacy and they will forward the refill request to us. Please allow 3 business days for your refill to be completed.          If you need to speak with a  for additional information , please call: [phone]             Additional Information About Your Visit        iFit Information     iFit gives you secure access to your electronic health record. If you see a primary care provider, you can also send messages to your care team and make appointments. If you have questions, please call your primary care clinic.  If you do not have a primary care provider, please call [phone] and they will assist you.        Care EveryWhere ID     This is your Care EveryWhere ID. This could be used by other organizations to access your [place] medical records  [id number]        Your Vitals Were     Pulse Temperature Height Pulse Oximetry BMI (Body Mass Index)       74 97  F (36.1  C) (Tympanic) 5' 7\" (1.702 m) 95% 34.3 kg/m2        Blood Pressure from Last 3 Encounters:   06/11/18 114/76   05/17/18 120/74   04/11/18 128/79    Weight from Last 3 Encounters:   06/11/18 219 lb (99.3 kg)   05/17/18 218 lb " 14.4 oz (99.3 kg)   04/11/18 217 lb 9.6 oz (98.7 kg)              Today, you had the following     No orders found for display         Today's Medication Changes          These changes are accurate as of 6/11/18 11:59 PM.  If you have any questions, ask your nurse or doctor.               Start taking these medicines.        Dose/Directions    doxycycline 100 MG capsule   Commonly known as:  VIBRAMYCIN   Used for:  Acute bronchitis, unspecified organism   Started by:  Miguel Guillaume MD        Dose:  100 mg   Take 1 capsule (100 mg) by mouth 2 times daily   Quantity:  20 capsule   Refills:  0            Where to get your medicines      These medications were sent to [id number] - [place], [place] - [address]  [address], [facility] [address]    Hours:  test fax successfully sent 10/22/03  Phone:  [phone]     doxycycline 100 MG capsule                Primary Care Provider Office Phone # Fax #    Miguel Guillaume [phone] [address] [address] [facility] [address]        Equal Access to Services     [facility]: Hadii aad ku hadasho Soomaali, waaxda luqadaha, qaybta kaalmada adeegyada, waxay essiein hayliyah sotelo . So [facility] [phone].    ATENCIÓN: Si habla español, tiene a masters disposición servicios gratuitos de asistencia lingüística. Llame al [phone].    We comply with applicable federal civil rights laws and [place] laws. We do not discriminate on the basis of race, color, national origin, age, disability, sex, sexual orientation, or gender identity.            Thank you!     Thank you for choosing [facility]  for your care. Our goal is always to provide you with excellent care. Hearing back from our patients is one way we can continue to improve our services. Please take a few minutes to complete the written survey that you may receive in the mail after your visit with us. Thank you!             Your Updated Medication  List - Protect others around you: Learn how to safely use, store and throw away your medicines at www.disposemymeds.org.          This list is accurate as of 6/11/18 11:59 PM.  Always use your most recent med list.                   Brand Name Dispense Instructions for use Diagnosis    doxycycline 100 MG capsule    VIBRAMYCIN    20 capsule    Take 1 capsule (100 mg) by mouth 2 times daily    Acute bronchitis, unspecified organism       Multi-vitamin Tabs tablet      Take 1 tablet by mouth daily

## 2018-06-11 NOTE — PROGRESS NOTES
"SUBJECTIVE:                                                    Sergo Cabrera is a 28 year old male who presents to clinic today for the following health issues:    ENT Symptoms             Symptoms: cc Present Absent Comment   Fever/Chills   x    Fatigue  x     Muscle Aches   x    Eye Irritation  x     Sneezing  x     Nasal [place]/Drg x x     Sinus Pressure/Pain  x     Loss of smell  x     Dental pain   x    Sore Throat  x     Swollen Glands  x     Ear Pain/Fullness   x    Cough  x     Wheeze   x    Chest Pain   x    Shortness of breath   x    Rash   x    Other   x      Symptom duration:  a week   Symptom severity:  moderate   Treatments tried:  Dayquil and NyQuil   Contacts:  Not that he is aware of, but he was recently traveling.          Problem list and histories reviewed & adjusted, as indicated.  Additional history:     Patient Active Problem List   Diagnosis   (none) - all problems resolved or deleted     Past Surgical History:   Procedure Laterality Date     EYE SURGERY         Social History   Substance Use Topics     Smoking status: Never Smoker     Smokeless tobacco: Never Used     Alcohol use Yes      Comment: 2-3 drinks per month     Family History   Problem Relation Age of Onset     DIABETES Mother      HEART DISEASE Mother      HEART DISEASE Maternal Grandfather            ROS:  Constitutional, HEENT, cardiovascular, pulmonary, gi and gu systems are negative, except as otherwise noted.    OBJECTIVE:                                                    /76 (BP Location: Right arm, Patient Position: Sitting, Cuff Size: Adult Large)  Pulse 74  Temp 97  F (36.1  C) (Tympanic)  Ht 5' 7\" (1.702 m)  Wt 219 lb (99.3 kg)  SpO2 95%  BMI 34.3 kg/m2 Body mass index is 34.3 kg/(m^2).   GENERAL: healthy, alert, well nourished, well hydrated, no distress  HENT: ear canals- normal; TMs- normal; Nose- normal; Mouth- no ulcers, no lesions  NECK: no tenderness, no adenopathy, no asymmetry, no masses, no " stiffness; thyroid- normal to palpation  RESP: lungs clear to auscultation - no rales, no rhonchi, no wheezes  CV: regular rates and rhythm, normal S1 S2, no S3 or S4 and no murmur, no click or rub -  ABDOMEN: soft, no tenderness, no  hepatosplenomegaly, no masses, normal bowel sounds       ASSESSMENT/PLAN:                                                      (J20.9) Acute bronchitis, unspecified organism  (primary encounter diagnosis)  Plan: doxycycline (VIBRAMYCIN) 100 MG capsule        return to clinic or call if unimproved in 3-4 days sooner if worse.         reports that he has never smoked. He has never used smokeless tobacco.      Weight management plan: Discussed healthy diet and exercise guidelines and patient will follow up in 6 months in clinic to re-evaluate.      [facility]

## 2019-08-12 ENCOUNTER — OFFICE VISIT (OUTPATIENT)
Dept: FAMILY MEDICINE | Facility: CLINIC | Age: 29
End: 2019-08-12
Payer: COMMERCIAL

## 2019-08-12 VITALS
SYSTOLIC BLOOD PRESSURE: 136 MMHG | HEART RATE: 103 BPM | OXYGEN SATURATION: 96 % | TEMPERATURE: 98.7 F | WEIGHT: 241.9 LBS | DIASTOLIC BLOOD PRESSURE: 87 MMHG | BODY MASS INDEX: 37.97 KG/M2 | HEIGHT: 67 IN

## 2019-08-12 DIAGNOSIS — J45.20 MILD INTERMITTENT ASTHMA WITHOUT COMPLICATION: Primary | ICD-10-CM

## 2019-08-12 PROCEDURE — 99213 OFFICE O/P EST LOW 20 MIN: CPT | Performed by: NURSE PRACTITIONER

## 2019-08-12 RX ORDER — ALBUTEROL SULFATE 90 UG/1
2 AEROSOL, METERED RESPIRATORY (INHALATION) EVERY 6 HOURS
Qty: 1 INHALER | Refills: 1 | Status: SHIPPED | OUTPATIENT
Start: 2019-08-12 | End: 2020-03-13

## 2019-08-12 ASSESSMENT — MIFFLIN-ST. JEOR: SCORE: 2015.88

## 2019-08-12 NOTE — PROGRESS NOTES
"Subjective     Sergo Cabrera is a 29 year old male who presents to clinic today for the following health issues:    HPI   Patient would like to have a check up but hasn't decided where he is going to get his routine health care    Medication Followup of Abuterol    Taking Medication as prescribed: as needed, had childhood asthma and just last year had a flare when he got a viral URI, also has some mild seasonal allergies with cough and chest tightness     Side Effects:  None    Medication Helping Symptoms:  yes     Patient Active Problem List   Diagnosis   (none) - all problems resolved or deleted     Past Surgical History:   Procedure Laterality Date     EYE SURGERY         Social History     Tobacco Use     Smoking status: Never Smoker     Smokeless tobacco: Never Used   Substance Use Topics     Alcohol use: Yes     Comment: 2-3 drinks per month     Family History   Problem Relation Age of Onset     Diabetes Mother      Heart Disease Mother      Heart Disease Maternal Grandfather          Current Outpatient Medications   Medication Sig Dispense Refill     albuterol (PROAIR HFA/PROVENTIL HFA/VENTOLIN HFA) 108 (90 Base) MCG/ACT inhaler Inhale 2 puffs into the lungs every 6 hours 1 Inhaler 1     doxycycline (VIBRAMYCIN) 100 MG capsule Take 1 capsule (100 mg) by mouth 2 times daily 20 capsule 0     multivitamin, therapeutic with minerals (MULTI-VITAMIN) TABS Take 1 tablet by mouth daily       Allergies   Allergen Reactions     Cephalosporins        Reviewed and updated as needed this visit by Provider         Review of Systems   ROS COMP: Constitutional, HEENT, cardiovascular, pulmonary, gi and gu systems are negative, except as otherwise noted.      Objective      /87 (BP Location: Right arm, Patient Position: Sitting, Cuff Size: Adult Regular)   Pulse 103   Temp 98.7  F (37.1  C) (Tympanic)   Ht 1.702 m (5' 7\")   Wt 109.7 kg (241 lb 14.4 oz)   SpO2 96%   BMI 37.89 kg/m      Physical Exam   GENERAL: " obese, alert and no distress  EYES: Eyes grossly normal to inspection, PERRL and conjunctivae and sclerae normal  HENT: ear canals and TM's normal, nose and mouth without ulcers or lesions  NECK: no adenopathy, no asymmetry, masses, or scars and thyroid normal to palpation  RESP: lungs clear to auscultation - no rales, rhonchi or wheezes  CV: regular rate and rhythm, normal S1 S2, no S3 or S4, no murmur, click or rub, no peripheral edema and peripheral pulses strong  MS: no gross musculoskeletal defects noted, no edema    Diagnostic Test Results:  Labs reviewed in Epic        Assessment & Plan       ICD-10-CM    1. Mild intermittent asthma without complication J45.20 albuterol (PROAIR HFA/PROVENTIL HFA/VENTOLIN HFA) 108 (90 Base) MCG/ACT inhaler   encouraged to followup with a new PCP if he is going to be establishing in the [facility]       TESSA Ayala [facility]

## 2019-09-20 ENCOUNTER — TELEPHONE (OUTPATIENT)
Dept: FAMILY MEDICINE | Facility: CLINIC | Age: 29
End: 2019-09-20

## 2019-09-20 NOTE — TELEPHONE ENCOUNTER
Panel Management Review      Patient has the following on his problem list:   Asthma review   No flowsheet data found.   1. Is Asthma diagnosis on the Problem List? No   2. Is Asthma listed on Health Maintenance? Yes    3. Patient is due for:  ACT and AAP      Composite cancer screening  Chart review shows that this patient is due/due soon for the following None  Summary:    Patient is due/failing the following:   AAP, ACT and PHYSICAL    Action needed:   Patient needs office visit for physical. and Patient needs to do ACT.    Type of outreach:    Sent letter. and Copy of ACT mailed to patient, will reach out in 5 days.    Questions for provider review:    None                                                                                                                                    Hawa Cano CMA       Chart routed to none.

## 2019-09-20 NOTE — LETTER
My Asthma Action Plan    Name: Sergo Cabrera   YOB: 1990  Date: 9/20/2019   My doctor: Miguel uGillaume MD   My clinic: [facility]        My Rescue Medicine:   Albuterol inhaler (Proair/Ventolin/Proventil HFA)  2-4 puffs EVERY 4 HOURS as needed. Use a spacer if recommended by your provider.   My Asthma Severity:   Intermittent / Exercise Induced  Know your asthma triggers: upper respiratory infections             GREEN ZONE   Good Control    I feel good    No cough or wheeze    Can work, sleep and play without asthma symptoms       Take your asthma control medicine every day.     1. If exercise triggers your asthma, take your rescue medication    15 minutes before exercise or sports, and    During exercise if you have asthma symptoms  2. Spacer to use with inhaler: If you have a spacer, make sure to use it with your inhaler             YELLOW ZONE Getting Worse  I have ANY of these:    I do not feel good    Cough or wheeze    Chest feels tight    Wake up at night   1. Keep taking your Green Zone medications  2. Start taking your rescue medicine:    every 20 minutes for up to 1 hour. Then every 4 hours for 24-48 hours.  3. If you stay in the Yellow Zone for more than 12-24 hours, contact your doctor.  4. If you do not return to the Green Zone in 12-24 hours or you get worse, start taking your oral steroid medicine if prescribed by your provider.           RED ZONE Medical Alert - Get Help  I have ANY of these:    I feel awful    Medicine is not helping    Breathing getting harder    Trouble walking or talking    Nose opens wide to breathe       1. Take your rescue medicine NOW  2. If your provider has prescribed an oral steroid medicine, start taking it NOW  3. Call your doctor NOW  4. If you are still in the Red Zone after 20 minutes and you have not reached your doctor:    Take your rescue medicine again and    Call 911 or go to the emergency room right away    See your regular doctor  within 2 weeks of an Emergency Room or Urgent Care visit for follow-up treatment.          Annual Reminders:  Meet with Asthma Educator,  Flu Shot in the Fall, consider Pneumonia Vaccination for patients with asthma (aged 19 and older).    Pharmacy:    [id number] - [place], [place] - [address] [id number] - [address]                        Asthma Triggers  How To Control Things That Make Your Asthma Worse    Triggers are things that make your asthma worse.  Look at the list below to help you find your triggers and   what you can do about them. You can help prevent asthma flare-ups by staying away from your triggers.      Trigger                                                          What you can do   Cigarette Smoke  Tobacco smoke can make asthma worse. Do not allow smoking in your home, car or around you.  Be sure no one smokes at a child s day care or school.  If you smoke, ask your health care provider for ways to help you quit.  Ask family members to quit too.  Ask your health care provider for a referral to Quit Plan to help you quit smoking, or call 4-177-452PLAN.     Colds, Flu, Bronchitis  These are common triggers of asthma. Wash your hands often.  Don t touch your eyes, nose or mouth.  Get a flu shot every year.     Dust Mites  These are tiny bugs that live in cloth or carpet. They are too small to see. Wash sheets and blankets in hot water every week.   Encase pillows and mattress in dust mite proof covers.  Avoid having carpet if you can. If you have carpet, vacuum weekly.   Use a dust mask and HEPA vacuum.   Pollen and Outdoor Mold  Some people are allergic to trees, grass, or weed pollen, or molds. Try to keep your windows closed.  Limit time out doors when pollen count is high.   Ask you health care provider about taking medicine during allergy season.     Animal Dander  Some people are allergic to skin flakes, urine or saliva from  pets with fur or feathers. Keep pets with fur or feathers out of your home.    If you can t keep the pet outdoors, then keep the pet out of your bedroom.  Keep the bedroom door closed.  Keep pets off cloth furniture and away from stuffed toys.     Mice, Rats, and Cockroaches  Some people are allergic to the waste from these pests.   Cover food and garbage.  Clean up spills and food crumbs.  Store grease in the refrigerator.   Keep food out of the bedroom.   Indoor Mold  This can be a trigger if your home has high moisture. Fix leaking faucets, pipes, or other sources of water.   Clean moldy surfaces.  Dehumidify basement if it is damp and smelly.   Smoke, Strong Odors, and Sprays  These can reduce air quality. Stay away from strong odors and sprays, such as perfume, powder, hair spray, paints, smoke incense, paint, cleaning products, candles and new carpet.   Exercise or Sports  Some people with asthma have this trigger. Be active!  Ask your doctor about taking medicine before sports or exercise to prevent symptoms.    Warm up for 5-10 minutes before and after sports or exercise.     Other Triggers of Asthma  Cold air:  Cover your nose and mouth with a scarf.  Sometimes laughing or crying can be a trigger.  Some medicines and food can trigger asthma.

## 2019-09-20 NOTE — LETTER
September 20, 2019      Sergo Cabrera  [address]  [place] [address]        Dear Sergo,     As part of [facility]'s commitment to health and wellness we have reviewed your chart and it indicates that you are due for one or more of the following:    -- An Asthma Control Test. To ensure you are receiving the best care, it is important to stay current with healthcare visits. We recommend that you have an office visit every 6 months for your asthma and have an  Updated Asthma Control Test and Asthma Action Plan each year to help you stay in good control with your asthma.      We have enclosed the Asthma Control Test for you to fill out and mail back to us (no matter what your score is).  If your end score is 20 or less, please schedule an appointment to discuss your asthma score.    -- Annual Physical. We recommend getting a physical done yearly.    Please try to schedule and/or complete the tests above within the next 2-4 weeks.   The number to call to schedule an appointment at [facility] is [phone].    While we work hard to maintain accurate records, it is always possible that this notice does not accurately reflect tests that you may have had. To ensure that we do not send you unnecessary notices please verify that we have accurate dates of your tests (even if these were done many years ago) or if you are seeking care at another clinic.      Sincerely,     Miguel Guillaume MD/  [facility] Care Team

## 2019-10-24 ASSESSMENT — ASTHMA QUESTIONNAIRES: ACT_TOTALSCORE: 21

## 2019-12-26 ENCOUNTER — OFFICE VISIT (OUTPATIENT)
Dept: FAMILY MEDICINE | Facility: CLINIC | Age: 29
End: 2019-12-26
Payer: COMMERCIAL

## 2019-12-26 VITALS
SYSTOLIC BLOOD PRESSURE: 153 MMHG | DIASTOLIC BLOOD PRESSURE: 82 MMHG | BODY MASS INDEX: 37.75 KG/M2 | WEIGHT: 241 LBS | TEMPERATURE: 99.2 F | OXYGEN SATURATION: 95 % | HEART RATE: 112 BPM

## 2019-12-26 DIAGNOSIS — J06.9 VIRAL URI WITH COUGH: ICD-10-CM

## 2019-12-26 DIAGNOSIS — R03.0 ELEVATED BLOOD PRESSURE READING WITHOUT DIAGNOSIS OF HYPERTENSION: Primary | ICD-10-CM

## 2019-12-26 PROCEDURE — 99213 OFFICE O/P EST LOW 20 MIN: CPT | Performed by: NURSE PRACTITIONER

## 2019-12-26 NOTE — PROGRESS NOTES
Subjective     Sergo Cabrera is a 29 year old male who presents to clinic today for the following health issues:    HPI     URI symptoms started4 or5 days ago developed some slight fever on Sunday and progressed over the past couple of days.  His symptoms are gradually improving but he still has an annoying cough that causes abd wall muscle pain .    Tmax 100.5    H/O childhood asthma and has used albuterol for bronchitis in the past  Has only used it once or twice in past few days while coughing     Has taken some decongestants      Patient Active Problem List   Diagnosis   (none) - all problems resolved or deleted     Past Surgical History:   Procedure Laterality Date     EYE SURGERY         Social History     Tobacco Use     Smoking status: Never Smoker     Smokeless tobacco: Never Used   Substance Use Topics     Alcohol use: Yes     Comment: 2-3 drinks per month     Family History   Problem Relation Age of Onset     Diabetes Mother      Heart Disease Mother      Heart Disease Maternal Grandfather          Current Outpatient Medications   Medication Sig Dispense Refill     albuterol (PROAIR HFA/PROVENTIL HFA/VENTOLIN HFA) 108 (90 Base) MCG/ACT inhaler Inhale 2 puffs into the lungs every 6 hours 1 Inhaler 1     multivitamin, therapeutic with minerals (MULTI-VITAMIN) TABS Take 1 tablet by mouth daily       Allergies   Allergen Reactions     Cephalosporins      Reviewed and updated as needed this visit by Provider    Review of Systems   ROS COMP: Constitutional, HEENT, cardiovascular, pulmonary, gi and gu systems are negative, except as otherwise noted.      Objective      BP (!) 153/82 (BP Location: Left arm, Patient Position: Sitting, Cuff Size: Adult Regular)   Pulse 112   Temp 99.2  F (37.3  C) (Tympanic)   Wt 109.3 kg (241 lb)   SpO2 95%   BMI 37.75 kg/m      Physical Exam   GENERAL: healthy, alert and moderate distress, febrile  EYES: Eyes grossly normal to inspection, PERRL and conjunctivae and sclerae  normal  HENT: ear canals and TM's normal, nose and mouth without ulcers or lesions, no sinus  tenderness  NECK: no adenopathy, no asymmetry, masses, or scars and thyroid normal to palpation  RESP: lungs clear to auscultation - no rales, rhonchi or wheezes  CV: regular rate and rhythm, normal S1 S2, no S3 or S4, no murmur, click or rub, no peripheral edema and peripheral pulses strong  SKIN; warm and damp    Diagnostic Test Results:  Labs reviewed in Epic        Assessment & Plan       ICD-10-CM    1. Elevated blood pressure reading without diagnosis of hypertension R03.0    2. Viral URI with cough J06.9     B97.89        Patient Instructions   Begin using aleve 220mg 2 twice a day and take with food  Continue with humidifier  Push fluids  Use the albuterol 2 inhalations every 6 hours  And take plain mucinex 1200mg twice a day  Avoid decongestants which are likely raising your BP  Call me or RTC if symptoms worsen or persist beyond the weekend    TESSA Ayala CNP  [facility]

## 2019-12-26 NOTE — PATIENT INSTRUCTIONS
Begin using aleve 220mg 2 twice a day and take with food  Continue with humidifier  Push fluids  Use the albuterol 2 inhalations every 6 hours  And take plain mucinex 1200mg twice a day

## 2020-03-13 ENCOUNTER — MYC REFILL (OUTPATIENT)
Dept: FAMILY MEDICINE | Facility: CLINIC | Age: 30
End: 2020-03-13

## 2020-03-13 DIAGNOSIS — J45.20 MILD INTERMITTENT ASTHMA WITHOUT COMPLICATION: ICD-10-CM

## 2020-03-15 RX ORDER — ALBUTEROL SULFATE 90 UG/1
2 AEROSOL, METERED RESPIRATORY (INHALATION) EVERY 6 HOURS
Qty: 1 INHALER | Refills: 1 | Status: SHIPPED | OUTPATIENT
Start: 2020-03-15 | End: 2021-03-03

## 2020-07-13 ENCOUNTER — VIRTUAL VISIT (OUTPATIENT)
Dept: FAMILY MEDICINE | Facility: OTHER | Age: 30
End: 2020-07-13

## 2020-07-14 NOTE — PROGRESS NOTES
"Date: 2020 20:05:11  Clinician: Trever Reina  Clinician NPI: [id number]  Patient: Sergo Cabrera  Patient : 1990  Patient Address: [address] [place] [address]  Patient Phone: [phone]  Visit Protocol: URI  Patient Summary:  Sergo is a 30 year old ( : 1990 ) male who initiated a Visit for COVID-19 (Coronavirus) evaluation and screening. When asked the question \"Please sign me up to receive news, health information and promotions. \", Sergo responded \"No\".    Sergo states his symptoms started gradually 7-9 days ago.   His symptoms consist of malaise, myalgia, and nasal congestion. He is experiencing mild difficulty breathing with activities but can speak normally in full sentences.   Symptom details   Nasal secretions: The color of his mucus is white and clear.   Sergo denies having wheezing, nausea, teeth pain, ageusia, diarrhea, vomiting, rhinitis, ear pain, headache, chills, sore throat, anosmia, facial pain or pressure, fever, and cough. He also denies having recent facial or sinus surgery in the past 60 days, taking antibiotic medication in the past month, and double sickening (worsening symptoms after initial improvement).   Precipitating events  He has not recently been exposed to someone with influenza. Sergo has been in close contact with the following high risk individuals: people with asthma, heart disease or diabetes.   Pertinent COVID-19 (Coronavirus) information  In the past 14 days, Sergo has not worked in a congregate living setting.   He does not work or volunteer as healthcare worker or a  and does not work or volunteer in a healthcare facility.   Sergo also has not lived in a congregate living setting in the past 14 days. He does not live with a healthcare worker.   Sergo has not had a close contact with a laboratory-confirmed COVID-19 patient within 14 days of symptom onset.   Pertinent medical history  Sergo does not need a return " to work/school note.   Weight: 240 lbs   Sergo does not smoke or use smokeless tobacco.   Additional information as reported by the patient (free text): I have traveled by plan as recently as 13 days ago. I only take my inhaler as needed. I had asthma in my childhood but was dormant for some time until it resurfaced about two years ago.   Weight: 240 lbs    MEDICATIONS: albuterol sulfate inhalation, ALLERGIES: Cephalosporins  Clinician Response:  Dear Sergo,   Your symptoms show that you may have coronavirus (COVID-19). This illness can cause fever, cough and trouble breathing. Many people get a mild case and get better on their own. Some people can get very sick.  What should I do?  We would like to test you for this virus.   1. Please call [phone] to schedule your visit. Explain that you were referred by [facility] to have a COVID-19 test. Be ready to share your [facility] visit ID number.  The following will serve as your written order for this COVID Test, ordered by me, for the indication of suspected COVID [Z20.828]: The test will be ordered in [employer], our electronic health record, after you are scheduled. It will show as ordered and authorized by Hermilo Muñiz MD.  Order: COVID-19 (Coronavirus) PCR for SYMPTOMATIC testing from [facility].      2. When it's time for your COVID test:  Stay at least 6 feet away from others. (If someone will drive you to your test, stay in the backseat, as far away from the  as you can.)   Cover your mouth and nose with a mask, tissue or washcloth.  Go straight to the testing site. Don't make any stops on the way there or back.      3.Starting now: Stay home and away from others (self-isolate) until:   You've had no fever---and no medicine that reduces fever---for 3 full days (72 hours). And...   Your other symptoms have gotten better. For example, your cough or breathing has improved. And...   At least 10 days have passed since your symptoms started.       During this time, don't  "leave the house except for testing or medical care.   Stay in your own room, even for meals. Use your own bathroom if you can.   Stay away from others in your home. No hugging, kissing or shaking hands. No visitors.  Don't go to work, school or anywhere else.    Clean \"high touch\" surfaces often (doorknobs, counters, handles, etc.). Use a household cleaning spray or wipes. You'll find a full list of  on the EPA website: www.epa.gov/pesticide-registration/list-n-disinfectants-use-against-sars-cov-2.   Cover your mouth and nose with a mask, tissue or washcloth to avoid spreading germs.  Wash your hands and face often. Use soap and water.  Caregivers in these groups are at risk for severe illness due to COVID-19:  o People 65 years and older  o People who live in a nursing home or long-term care facility  o People with chronic disease (lung, heart, cancer, diabetes, kidney, liver, immunologic)  o People who have a weakened immune system, including those who:   Are in cancer treatment  Take medicine that weakens the immune system, such as corticosteroids  Had a bone marrow or organ transplant  Have an immune deficiency  Have poorly controlled HIV or AIDS  Are obese (body mass index of 40 or higher)  Smoke regularly   o Caregivers should wear gloves while washing dishes, handling laundry and cleaning bedrooms and bathrooms.  o Use caution when washing and drying laundry: Don't shake dirty laundry, and use the warmest water setting that you can.  o For more tips, go to www.cdc.gov/coronavirus/2019-ncov/downloads/10Things.pdf.    4.Sign up for [employer]. We know it's scary to hear that you might have COVID-19. We want to track your symptoms to make sure you're okay over the next 2 weeks. Please look for an email from [employer]---this is a free, online program that we'll use to keep in touch. To sign up, follow the link in the email. Learn more at [email or web address]  How can I take care of " myself?   Get lots of rest. Drink extra fluids (unless a doctor has told you not to).   Take Tylenol (acetaminophen) for fever or pain. If you have liver or kidney problems, ask your family doctor if it's okay to take Tylenol.   Adults can take either:    650 mg (two 325 mg pills) every 4 to 6 hours, or...   1,000 mg (two 500 mg pills) every 8 hours as needed.    Note: Don't take more than 3,000 mg in one day. Acetaminophen is found in many medicines (both prescribed and over-the-counter medicines). Read all labels to be sure you don't take too much.   For children, check the Tylenol bottle for the right dose. The dose is based on the child's age or weight.    If you have other health problems (like cancer, heart failure, an organ transplant or severe kidney disease): Call your specialty clinic if you don't feel better in the next 2 days.       Know when to call 911. Emergency warning signs include:    Trouble breathing or shortness of breath Pain or pressure in the chest that doesn't go away Feeling confused like you haven't felt before, or not being able to wake up Bluish-colored lips or face.  Where can I get more information?   [facility] -- About COVID-19: [email or web address]   CDC -- What to Do If You're Sick: www.cdc.gov/coronavirus/2019-ncov/about/steps-when-sick.html   CDC -- Ending Home Isolation: www.cdc.gov/coronavirus/2019-ncov/hcp/disposition-in-home-patients.html   CDC -- Caring for Someone: www.cdc.gov/coronavirus/2019-ncov/if-you-are-sick/care-for-someone.html   [facility] -- Interim Guidance for Hospital Discharge to Home: www.health.[facility].[place]/diseases/coronavirus/hcp/hospdischarge.pdf   [facility] clinical trials (COVID-19 research studies): clinicalaffairs.[facility]/umn-clinical-trials    Below are the COVID-19 hotlines at the [place] [facility] ([facility]). Interpreters are available.    For health questions: Call 632-351-4633 or 1-596.623.4610 (7 a.m. to 7 p.m.)  For questions about schools and childcare: Call 794-500-6044 or 1-729.425.1496 (7 a.m. to 7 p.m.)    Diagnosis: Other malaise  Diagnosis ICD: R53.81

## 2020-07-15 DIAGNOSIS — Z20.822 SUSPECTED COVID-19 VIRUS INFECTION: Primary | ICD-10-CM

## 2020-07-15 PROCEDURE — U0003 INFECTIOUS AGENT DETECTION BY NUCLEIC ACID (DNA OR RNA); SEVERE ACUTE RESPIRATORY SYNDROME CORONAVIRUS 2 (SARS-COV-2) (CORONAVIRUS DISEASE [COVID-19]), AMPLIFIED PROBE TECHNIQUE, MAKING USE OF HIGH THROUGHPUT TECHNOLOGIES AS DESCRIBED BY CMS-2020-01-R: HCPCS | Performed by: FAMILY MEDICINE

## 2020-07-15 NOTE — LETTER
July 20, 2020        Sergo Cabrera  [address]  [place] [address]    This letter provides a written record that you were tested for COVID-19 on 7/15/20.       Your result was negative. This means that we didn t find the virus that causes COVID-19 in your sample. A test may show negative when you do actually have the virus. This can happen when the virus is in the early stages of infection, before you feel illness symptoms.    If you have symptoms   Stay home and away from others (self-isolate) until you meet ALL of the guidelines below:    You ve had no fever--and no medicine that reduces fever--for 3 full days (72 hours). And      Your other symptoms have gotten better. For example, your cough or breathing has improved. And     At least 10 days have passed since your symptoms started.    During this time:    Stay home. Don t go to work, school or anywhere else.     Stay in your own room, including for meals. Use your own bathroom if you can.    Stay away from others in your home. No hugging, kissing or shaking hands. No visitors.    Clean  high touch  surfaces often (doorknobs, counters, handles, etc.). Use a household cleaning spray or wipes. You can find a full list on the EPA website at www.epa.gov/pesticide-registration/list-n-disinfectants-use-against-sars-cov-2.    Cover your mouth and nose with a mask, tissue or washcloth to avoid spreading germs.    Wash your hands and face often with soap and water.    Going back to work  Check with your employer for any guidelines to follow for going back to work.    Employers: This document serves as formal notice that your employee tested negative for COVID-19, as of the testing date shown above.

## 2020-07-16 LAB
SARS-COV-2 RNA SPEC QL NAA+PROBE: NOT DETECTED
SPECIMEN SOURCE: NORMAL

## 2021-03-02 ENCOUNTER — NURSE TRIAGE (OUTPATIENT)
Dept: FAMILY MEDICINE | Facility: CLINIC | Age: 31
End: 2021-03-02

## 2021-03-02 NOTE — TELEPHONE ENCOUNTER
Pt responded  Called patient, states discomfort is improving, this has been something that has occurred off/on in the past - scheduled visit with Dr Villa tomorrow Am. Pt wants to establish care with provider at our clinic     Lindsey IZQUIERDO RN       Next 5 appointments (look out 90 days)    Mar 03, 2021  8:00 AM  Office Visit with Justen Villa MD  [facility] [place] [facility] [address] [facility] [address]  [phone]          ----- Message -----   From: Sergo Cabrera   Sent: 3/2/2021   1:28 PM CST   To:  [facility]uling [facility]   Subject: RE: Appointment Request                            1. Mostly lower left side of body, between rib cage and pelvis   2. Was focused in that area.   3. Early Sunday morning, woke up feeling it.   4. Gradual, built up for a few hours but eventually faded away.   5. Built up for a few hours, then consistent for an hour, then faded away after several hours. I am not in pain now but the left side of my abdomen is a little tender.   6. Moderate at the time. I could still move around but had to do it slowly. Now I am fine minus the left side of my abdomen being tender.   7. I had this happen a year or so ago and experienced similar symptoms. I felt normal after a few days. Another case happened over two years ago, again got better after few days.   8. Possibly something I ate/eating habits and/or sleeping in uncomfortable position.   9. Resting on my opposite side or resting on affected side with ice pack/heat pack.   10. Each time I've had this I felt like I had to vomit but did not feel nauseated. When I did did actually vomit, the amount I threw up was very small (1/4th cup at most in total).      ----- Message -----   From: Lindsey HO   Sent: 3/2/21 12:24   To: Sergo Cabrera   Subject: RE: Appointment Request      Giacomo Trotter,    We tried calling you to discuss this with one of our triage nurses to make sure you are  "safe/okay to wait for an appointment - we have a few questions before scheduling you. Please respond to the questions below, or alternatively you can call and speak with a triage nurse here. ([phone])      1. LOCATION: \"Where does it hurt?\"    2. RADIATION: \"Does the pain shoot anywhere else?\" (e.g., chest, back)   3. ONSET: \"When did the pain begin?\" (Minutes, hours or days ago)    4. SUDDEN: \"Gradual or sudden onset?\"   5. PATTERN \"Does the pain come and go, or is it constant?\"   - If constant: \"Is it getting better, staying the same, or worsening?\"    (Note: Constant means the pain never goes away completely; most serious pain is constant and it progresses)    - If intermittent: \"How long does it last?\" \"Do you have pain now?\"   (Note: Intermittent means the pain goes away completely between bouts)   6. SEVERITY: \"How bad is the pain?\" (e.g., Scale 1-10; mild, moderate, or severe)   - MILD (1-3): doesn't interfere with normal activities, abdomen soft and not tender to touch    - MODERATE (4-7): interferes with normal activities or awakens from sleep, tender to touch    - SEVERE (8-10): excruciating pain, doubled over, unable to do any normal activities    7. RECURRENT SYMPTOM: \"Have you ever had this type of abdominal pain before?\" If so, ask: \"When was the last time?\" and \"What happened that time?\"    8. CAUSE: \"What do you think is causing the abdominal pain?\"   9. RELIEVING/AGGRAVATING FACTORS: \"What makes it better or worse?\" (e.g., movement, antacids, bowel movement)   10. OTHER SYMPTOMS: \"Has there been any vomiting, diarrhea, constipation, or urine problems?\"         ----- Message -----        From:Sergo Cabrera        Sent:2/28/2021  9:15 PM CST          To:Elenita FONSECA     Subject:Appointment Request      Appointment Request From: Sergo Cabrera      With Provider: TESSA Ayala CNP [[facility]]      Preferred Date Range: 3/1/2021 - 3/3/2021      Preferred Times: Any Time "      Reason for visit: Request an Appointment      Comments:   Cramps, abdominal discomfort/pain

## 2021-03-02 NOTE — TELEPHONE ENCOUNTER
Appointment Request From: Sergo Cabrera     With Provider: TESSA Ayala CNP [[facility]]     Preferred Date Range: 3/1/2021 - 3/3/2021     Preferred Times: Any Time     Reason for visit: Request an Appointment     Comments:  Cramps, abdominal discomfort/pain

## 2021-03-02 NOTE — TELEPHONE ENCOUNTER
Left message asking patient to call triage back   Also sent MyChart message to patient     Lindsey IZQUIERDO RN

## 2021-03-03 ENCOUNTER — OFFICE VISIT (OUTPATIENT)
Dept: FAMILY MEDICINE | Facility: CLINIC | Age: 31
End: 2021-03-03
Payer: COMMERCIAL

## 2021-03-03 VITALS
OXYGEN SATURATION: 97 % | HEART RATE: 107 BPM | WEIGHT: 240.4 LBS | DIASTOLIC BLOOD PRESSURE: 83 MMHG | SYSTOLIC BLOOD PRESSURE: 133 MMHG | BODY MASS INDEX: 37.65 KG/M2 | TEMPERATURE: 95.5 F

## 2021-03-03 DIAGNOSIS — R03.0 ELEVATED BLOOD PRESSURE READING WITHOUT DIAGNOSIS OF HYPERTENSION: ICD-10-CM

## 2021-03-03 DIAGNOSIS — J45.20 MILD INTERMITTENT ASTHMA WITHOUT COMPLICATION: ICD-10-CM

## 2021-03-03 DIAGNOSIS — Z13.220 LIPID SCREENING: ICD-10-CM

## 2021-03-03 DIAGNOSIS — R10.32 ABDOMINAL DISCOMFORT IN LEFT LOWER QUADRANT: Primary | ICD-10-CM

## 2021-03-03 DIAGNOSIS — Z13.1 SCREENING FOR DIABETES MELLITUS: ICD-10-CM

## 2021-03-03 LAB
ALBUMIN SERPL-MCNC: 4.1 G/DL (ref 3.4–5)
ALP SERPL-CCNC: 57 U/L (ref 40–150)
ALT SERPL W P-5'-P-CCNC: 21 U/L (ref 0–70)
ANION GAP SERPL CALCULATED.3IONS-SCNC: 6 MMOL/L (ref 3–14)
AST SERPL W P-5'-P-CCNC: 9 U/L (ref 0–45)
BILIRUB SERPL-MCNC: 1 MG/DL (ref 0.2–1.3)
BUN SERPL-MCNC: 11 MG/DL (ref 7–30)
CALCIUM SERPL-MCNC: 9.5 MG/DL (ref 8.5–10.1)
CHLORIDE SERPL-SCNC: 104 MMOL/L (ref 94–109)
CHOLEST SERPL-MCNC: 195 MG/DL
CO2 SERPL-SCNC: 28 MMOL/L (ref 20–32)
CREAT SERPL-MCNC: 0.75 MG/DL (ref 0.66–1.25)
CRP SERPL-MCNC: 3.6 MG/L (ref 0–8)
ERYTHROCYTE [DISTWIDTH] IN BLOOD BY AUTOMATED COUNT: 12.1 % (ref 10–15)
GFR SERPL CREATININE-BSD FRML MDRD: >90 ML/MIN/{1.73_M2}
GLUCOSE SERPL-MCNC: 84 MG/DL (ref 70–99)
HBA1C MFR BLD: 5 % (ref 0–5.6)
HCT VFR BLD AUTO: 42.4 % (ref 40–53)
HDLC SERPL-MCNC: 35 MG/DL
HGB BLD-MCNC: 15 G/DL (ref 13.3–17.7)
LDLC SERPL CALC-MCNC: 119 MG/DL
MCH RBC QN AUTO: 31.8 PG (ref 26.5–33)
MCHC RBC AUTO-ENTMCNC: 35.4 G/DL (ref 31.5–36.5)
MCV RBC AUTO: 90 FL (ref 78–100)
NONHDLC SERPL-MCNC: 160 MG/DL
PLATELET # BLD AUTO: 307 10E9/L (ref 150–450)
POTASSIUM SERPL-SCNC: 3.8 MMOL/L (ref 3.4–5.3)
PROT SERPL-MCNC: 7.9 G/DL (ref 6.8–8.8)
RBC # BLD AUTO: 4.72 10E12/L (ref 4.4–5.9)
SODIUM SERPL-SCNC: 138 MMOL/L (ref 133–144)
TRIGL SERPL-MCNC: 207 MG/DL
WBC # BLD AUTO: 6.7 10E9/L (ref 4–11)

## 2021-03-03 PROCEDURE — 86140 C-REACTIVE PROTEIN: CPT | Performed by: INTERNAL MEDICINE

## 2021-03-03 PROCEDURE — 83036 HEMOGLOBIN GLYCOSYLATED A1C: CPT | Performed by: INTERNAL MEDICINE

## 2021-03-03 PROCEDURE — 36415 COLL VENOUS BLD VENIPUNCTURE: CPT | Performed by: INTERNAL MEDICINE

## 2021-03-03 PROCEDURE — 99214 OFFICE O/P EST MOD 30 MIN: CPT | Performed by: INTERNAL MEDICINE

## 2021-03-03 PROCEDURE — 80053 COMPREHEN METABOLIC PANEL: CPT | Performed by: INTERNAL MEDICINE

## 2021-03-03 PROCEDURE — 80061 LIPID PANEL: CPT | Performed by: INTERNAL MEDICINE

## 2021-03-03 PROCEDURE — 85027 COMPLETE CBC AUTOMATED: CPT | Performed by: INTERNAL MEDICINE

## 2021-03-03 RX ORDER — ALBUTEROL SULFATE 90 UG/1
2 AEROSOL, METERED RESPIRATORY (INHALATION) EVERY 6 HOURS PRN
Qty: 1 INHALER | Refills: 1 | Status: SHIPPED | OUTPATIENT
Start: 2021-03-03 | End: 2023-08-14

## 2021-03-03 NOTE — PROGRESS NOTES
Assessment & Plan   Problem List Items Addressed This Visit     None      Visit Diagnoses     Abdominal discomfort in left lower quadrant    -  Primary    Relevant Orders    CBC with platelets    Comprehensive metabolic panel (BMP + Alb, Alk Phos, ALT, AST, Total. Bili, TP)    CRP, inflammation    Mild intermittent asthma without complication        Relevant Medications    albuterol (PROAIR HFA/PROVENTIL HFA/VENTOLIN HFA) 108 (90 Base) MCG/ACT inhaler    Lipid screening        Relevant Orders    Lipid panel reflex to direct LDL Fasting    Screening for diabetes mellitus        Relevant Orders    Hemoglobin A1c    Elevated blood pressure reading without diagnosis of hypertension        Relevant Orders    24 Hour Blood Pressure Monitor - Adult         Abdominal pain was left lower, describes abdominal discomfort now in the left upper ; more when he has these intermittent short spells of cough.;  That are chronic.  He was advised in the past use anti-inflammatories such as Aleve, advised that he does some stretching exercise to improve the muscle wall tension of the abdomen.  I did not feel any enlarged spleen, abdomen is soft on exam ,no tenderness no peritoneal signs, no rigidity.  Bowel sounds are normal ;slightly on the low side, advised to increase fiber in the diet including more fruits and vegetables and make sure is well-hydrated.  We will refill his albuterol to use as needed, we discussed asthma action plan, if symptoms progress use more frequently the albuterol inhaler and notify doctor.  We will check some labs today blood pressure slightly elevated, there was some concern with elevated blood pressure in the past. will order 24-hour blood pressure ambulatory reading [you will get a machine at your home.]  If blood pressure is more than 130/80 we will readdress.  Continue with lifestyle changes, exercise activity, losing weight will help resolve the abdominal symptoms.    If cough gets worse, get  "shortness of breath or pain when you take a deep breath to notify doctor immediately.    We will check inflammatory marker, CBC ,comprehensive panel.  And will check screening for lipid and diabetes screening today.  Patient declined peumovaccine today with flu vaccine.         MEDICATIONS:  Continue current medications without change  Work on weight loss  Regular exercise  See Patient Instructions    Return in about 3 months (around 6/3/2021), or if symptoms worsen or fail to improve, for As needed and if symptoms worsen, BP Recheck, Physical Exam.    Justen Villa MD  [facility] ELLIOTT Trotter is a 31 year old who presents for the following health issues HPI       New Patient/Transfer of Care  Abdominal/Flank Pain  Onset/Duration: Early Sunday morning  Description:   Character: discomfort    Location: Mostly lower left side of body, between rib cage and pelvis  Radiation: None. Was focused in that area  Intensity: Moderate at the time  Progression of Symptoms:  Improving - tenderness in abdomen at the moment   Accompanying Signs & Symptoms:  Fever/Chills: no  Gas/Bloating: YES  Nausea: no  Vomitting: YES  Diarrhea: no  Constipation: no  Dysuria or Hematuria: no  History:   Trauma: no  Previous similar pain: YES - over a year ago   Previous tests done: none  Precipitating factors:   Does the pain change with:     Food: no    Bowel Movement: no    Urination: no   Other factors:  no  Therapies tried and outcome: Ibuprofen - \"Made some difference\"        MOVED FROM [facility]    Not using albuterol often , only when he gets a cold, for most part    Feeling \"little off\" he takes    Feeling left lower abd pain, for last 68 hrs, was enough to make slow movements; 5-6 /10, no h/o diverticulitis  Has gassiness \"little\", not sure of bloated,   \"loose bit loose stools\", no constipation, no blood in stools, last BM yesterday afternoon, not much relief of sx's. Now has \"little discomfort\" " "noticing when he cough, \"minimal intermittent cough\"    No COVID infection or exposure that he is aware, cough is not getting work     No GERD sx's    Review of Systems   Constitutional, HEENT, cardiovascular, pulmonary, gi and gu systems are negative, except as otherwise noted.      Objective    /83 (BP Location: Right arm, Patient Position: Sitting, Cuff Size: Adult Regular)   Pulse 107   Temp 95.5  F (35.3  C) (Temporal)   Wt 109 kg (240 lb 6.4 oz)   SpO2 97%   BMI 37.65 kg/m    Body mass index is 37.65 kg/m .  Physical Exam   GENERAL: healthy, alert and no distress  EYES: Eyes grossly normal to inspection, PERRL and conjunctivae and sclerae normal, wears  RESP: lungs clear to auscultation - no rales, rhonchi or wheezes  CV: regular rate and rhythm, normal S1 S2, no S3 or S4, no murmur, click or rub, no peripheral edema and peripheral pulses strong  ABDOMEN: soft, nontender, obese, no hepatosplenomegaly, no masses and bowel sounds normal  MS: no gross musculoskeletal defects noted, no edema  SKIN: no suspicious lesions or rashes  NEURO: Normal strength and tone, mentation intact and speech normal  PSYCH: mentation appears normal, affect normal/bright    Orders Only on 07/15/2020   Component Date Value Ref Range Status     COVID-19 Virus PCR to [facility] - So* 07/15/2020 Nasopharyngeal   Final     COVID-19 Virus PCR to [facility] - Re* 07/15/2020 Not Detected   Final    Comment: Collection of multiple specimens from the same patient may be necessary to   detect the virus. The possibility of a false negative should be considered if   the patient's recent exposure or clinical presentation suggests 2019 nCOV   infection and diagnostic tests for other causes of illness are negative.   Repeat testing may be considered in this setting.  Viral RNA was extracted via a validated method and subsequently underwent   single step reverse transcriptase-real time polymerase chain reaction using   primers to the CDC " specified N1,N2 gene targets of CoV2 and human RNP as an   internal control.  A negative result does not rule out the presence of real-time PCR inhibitors   in the specimen or COVID-19 RNA in concentrations below the limit of detection   of the assay. The possibility of a false negative should be considered if the   patients recent exposure or clinical presentation suggests COVID-19.   Additional testing or repeat testing requires consultation with the   laborator                           y.  Nasopharyngeal specimen is the preferred choice for swab-based SARS CoV2   testing. When collection of a nasopharyngeal swab is not possible the   following are acceptable alternatives:  an oropharyngeal (OP) specimen collected by a healthcare professional, or a   nasal mid-turbinate (NMT) swab collected by a healthcare professional or by   onsite self-collection (using a flocked tapered swab), or an anterior nares   specimen collected by a healthcare professional or by onsite self-collection   (using a round foam swab). (Centers for Disease Control)  Testing performed by [facility], Room 1[address]210, [address] [place] [address]. This test was developed and its   performance characteristics determined by the [facility]. It has not been cleared or approved by the FDA.  The laboratory is regulated under the Clinical Laboratory Improvement   Amendments of 1988 (CLIA-88) as qualified to perform high-complexity testin                           g.   This test is used for clinical purposes. It should not be regarded as   investigational or for research.

## 2021-03-03 NOTE — LETTER
My Asthma Action Plan    Name: Sergo Cabrera   YOB: 1990  Date: 3/3/2021   My doctor: Justen Villa MD   My clinic: [facility]        My Rescue Medicine:   Albuterol inhaler (Proair/Ventolin/Proventil HFA)  2-4 puffs EVERY 4 HOURS as needed. Use a spacer if recommended by your provider.   My Asthma Severity:   Intermittent / Exercise Induced  Know your asthma triggers: upper respiratory infections             GREEN ZONE   Good Control    I feel good    No cough or wheeze    Can work, sleep and play without asthma symptoms       Take your asthma control medicine every day.     1. If exercise triggers your asthma, take your rescue medication    15 minutes before exercise or sports, and    During exercise if you have asthma symptoms  2. Spacer to use with inhaler: If you have a spacer, make sure to use it with your inhaler             YELLOW ZONE Getting Worse  I have ANY of these:    I do not feel good    Cough or wheeze    Chest feels tight    Wake up at night   1. Keep taking your Green Zone medications  2. Start taking your rescue medicine:    every 20 minutes for up to 1 hour. Then every 4 hours for 24-48 hours.  3. If you stay in the Yellow Zone for more than 12-24 hours, contact your doctor.  4. If you do not return to the Green Zone in 12-24 hours or you get worse, start taking your oral steroid medicine if prescribed by your provider.           RED ZONE Medical Alert - Get Help  I have ANY of these:    I feel awful    Medicine is not helping    Breathing getting harder    Trouble walking or talking    Nose opens wide to breathe       1. Take your rescue medicine NOW  2. If your provider has prescribed an oral steroid medicine, start taking it NOW  3. Call your doctor NOW  4. If you are still in the Red Zone after 20 minutes and you have not reached your doctor:    Take your rescue medicine again and    Call 911 or go to the emergency room right away    See your regular  doctor within 2 weeks of an Emergency Room or Urgent Care visit for follow-up treatment.          Annual Reminders:  Meet with Asthma Educator,  Flu Shot in the Fall, consider Pneumonia Vaccination for patients with asthma (aged 19 and older).    Pharmacy:    [id number] - [facility] [address] [place] [address]   [id number] - [address]    Electronically signed by Justen Villa MD   Date: 03/03/21                    Asthma Triggers  How To Control Things That Make Your Asthma Worse    Triggers are things that make your asthma worse.  Look at the list below to help you find your triggers and   what you can do about them. You can help prevent asthma flare-ups by staying away from your triggers.      Trigger                                                          What you can do   Cigarette Smoke  Tobacco smoke can make asthma worse. Do not allow smoking in your home, car or around you.  Be sure no one smokes at a child s day care or school.  If you smoke, ask your health care provider for ways to help you quit.  Ask family members to quit too.  Ask your health care provider for a referral to Quit Plan to help you quit smoking, or call 1-512-453-PLAN.     Colds, Flu, Bronchitis  These are common triggers of asthma. Wash your hands often.  Don t touch your eyes, nose or mouth.  Get a flu shot every year.     Dust Mites  These are tiny bugs that live in cloth or carpet. They are too small to see. Wash sheets and blankets in hot water every week.   Encase pillows and mattress in dust mite proof covers.  Avoid having carpet if you can. If you have carpet, vacuum weekly.   Use a dust mask and HEPA vacuum.   Pollen and Outdoor Mold  Some people are allergic to trees, grass, or weed pollen, or molds. Try to keep your windows closed.  Limit time out doors when pollen count is high.   Ask you health care provider about taking medicine during allergy season.     Animal  Dander  Some people are allergic to skin flakes, urine or saliva from pets with fur or feathers. Keep pets with fur or feathers out of your home.    If you can t keep the pet outdoors, then keep the pet out of your bedroom.  Keep the bedroom door closed.  Keep pets off cloth furniture and away from stuffed toys.     Mice, Rats, and Cockroaches  Some people are allergic to the waste from these pests.   Cover food and garbage.  Clean up spills and food crumbs.  Store grease in the refrigerator.   Keep food out of the bedroom.   Indoor Mold  This can be a trigger if your home has high moisture. Fix leaking faucets, pipes, or other sources of water.   Clean moldy surfaces.  Dehumidify basement if it is damp and smelly.   Smoke, Strong Odors, and Sprays  These can reduce air quality. Stay away from strong odors and sprays, such as perfume, powder, hair spray, paints, smoke incense, paint, cleaning products, candles and new carpet.   Exercise or Sports  Some people with asthma have this trigger. Be active!  Ask your doctor about taking medicine before sports or exercise to prevent symptoms.    Warm up for 5-10 minutes before and after sports or exercise.     Other Triggers of Asthma  Cold air:  Cover your nose and mouth with a scarf.  Sometimes laughing or crying can be a trigger.  Some medicines and food can trigger asthma.

## 2021-03-04 ASSESSMENT — ASTHMA QUESTIONNAIRES: ACT_TOTALSCORE: 23

## 2021-04-02 ENCOUNTER — TRANSFERRED RECORDS (OUTPATIENT)
Dept: HEALTH INFORMATION MANAGEMENT | Facility: CLINIC | Age: 31
End: 2021-04-02

## 2021-04-02 ENCOUNTER — HOSPITAL ENCOUNTER (OUTPATIENT)
Dept: CARDIOLOGY | Facility: CLINIC | Age: 31
Discharge: HOME | End: 2021-04-02
Attending: INTERNAL MEDICINE | Admitting: INTERNAL MEDICINE
Payer: COMMERCIAL

## 2021-04-02 DIAGNOSIS — R03.0 ELEVATED BLOOD PRESSURE READING WITHOUT DIAGNOSIS OF HYPERTENSION: ICD-10-CM

## 2021-04-02 PROCEDURE — 93788 AMBL BP MNTR W/SW A/R: CPT

## 2021-12-08 ENCOUNTER — IMMUNIZATION (OUTPATIENT)
Dept: NURSING | Facility: CLINIC | Age: 31
End: 2021-12-08
Payer: COMMERCIAL

## 2021-12-08 PROCEDURE — 0004A PR COVID VAC PFIZER DIL RECON 30 MCG/0.3 ML IM: CPT

## 2021-12-08 PROCEDURE — 91300 PR COVID VAC PFIZER DIL RECON 30 MCG/0.3 ML IM: CPT

## 2022-10-17 ASSESSMENT — ASTHMA QUESTIONNAIRES
QUESTION_1 LAST FOUR WEEKS HOW MUCH OF THE TIME DID YOUR ASTHMA KEEP YOU FROM GETTING AS MUCH DONE AT WORK, SCHOOL OR AT HOME: NONE OF THE TIME
QUESTION_2 LAST FOUR WEEKS HOW OFTEN HAVE YOU HAD SHORTNESS OF BREATH: ONCE OR TWICE A WEEK
QUESTION_5 LAST FOUR WEEKS HOW WOULD YOU RATE YOUR ASTHMA CONTROL: WELL CONTROLLED
QUESTION_4 LAST FOUR WEEKS HOW OFTEN HAVE YOU USED YOUR RESCUE INHALER OR NEBULIZER MEDICATION (SUCH AS ALBUTEROL): NOT AT ALL
QUESTION_3 LAST FOUR WEEKS HOW OFTEN DID YOUR ASTHMA SYMPTOMS (WHEEZING, COUGHING, SHORTNESS OF BREATH, CHEST TIGHTNESS OR PAIN) WAKE YOU UP AT NIGHT OR EARLIER THAN USUAL IN THE MORNING: NOT AT ALL
ACT_TOTALSCORE: 23
ACT_TOTALSCORE: 23

## 2022-10-18 ENCOUNTER — OFFICE VISIT (OUTPATIENT)
Dept: FAMILY MEDICINE | Facility: CLINIC | Age: 32
End: 2022-10-18
Payer: COMMERCIAL

## 2022-10-18 VITALS
TEMPERATURE: 97.4 F | HEART RATE: 95 BPM | BODY MASS INDEX: 39.4 KG/M2 | OXYGEN SATURATION: 98 % | RESPIRATION RATE: 20 BRPM | HEIGHT: 69 IN | DIASTOLIC BLOOD PRESSURE: 84 MMHG | SYSTOLIC BLOOD PRESSURE: 141 MMHG | WEIGHT: 266 LBS

## 2022-10-18 DIAGNOSIS — R10.9 RIGHT SIDED ABDOMINAL PAIN: Primary | ICD-10-CM

## 2022-10-18 DIAGNOSIS — R03.0 ELEVATED BLOOD PRESSURE READING WITHOUT DIAGNOSIS OF HYPERTENSION: ICD-10-CM

## 2022-10-18 LAB
ALBUMIN SERPL-MCNC: 3.9 G/DL (ref 3.4–5)
ALBUMIN UR-MCNC: NEGATIVE MG/DL
ALP SERPL-CCNC: 59 U/L (ref 40–150)
ALT SERPL W P-5'-P-CCNC: 21 U/L (ref 0–70)
ANION GAP SERPL CALCULATED.3IONS-SCNC: <1 MMOL/L (ref 3–14)
APPEARANCE UR: CLEAR
AST SERPL W P-5'-P-CCNC: 11 U/L (ref 0–45)
BASOPHILS # BLD AUTO: 0 10E3/UL (ref 0–0.2)
BASOPHILS NFR BLD AUTO: 1 %
BILIRUB SERPL-MCNC: 0.8 MG/DL (ref 0.2–1.3)
BILIRUB UR QL STRIP: NEGATIVE
BUN SERPL-MCNC: 11 MG/DL (ref 7–30)
CALCIUM SERPL-MCNC: 9.1 MG/DL (ref 8.5–10.1)
CHLORIDE BLD-SCNC: 106 MMOL/L (ref 94–109)
CO2 SERPL-SCNC: 32 MMOL/L (ref 20–32)
COLOR UR AUTO: YELLOW
CREAT SERPL-MCNC: 0.71 MG/DL (ref 0.66–1.25)
EOSINOPHIL # BLD AUTO: 0.2 10E3/UL (ref 0–0.7)
EOSINOPHIL NFR BLD AUTO: 2 %
ERYTHROCYTE [DISTWIDTH] IN BLOOD BY AUTOMATED COUNT: 11.9 % (ref 10–15)
GFR SERPL CREATININE-BSD FRML MDRD: >90 ML/MIN/1.73M2
GLUCOSE BLD-MCNC: 101 MG/DL (ref 70–99)
GLUCOSE UR STRIP-MCNC: NEGATIVE MG/DL
HCT VFR BLD AUTO: 45.1 % (ref 40–53)
HGB BLD-MCNC: 15.5 G/DL (ref 13.3–17.7)
HGB UR QL STRIP: NEGATIVE
IMM GRANULOCYTES # BLD: 0 10E3/UL
IMM GRANULOCYTES NFR BLD: 0 %
KETONES UR STRIP-MCNC: NEGATIVE MG/DL
LEUKOCYTE ESTERASE UR QL STRIP: NEGATIVE
LIPASE SERPL-CCNC: 88 U/L (ref 73–393)
LYMPHOCYTES # BLD AUTO: 2.4 10E3/UL (ref 0.8–5.3)
LYMPHOCYTES NFR BLD AUTO: 35 %
MCH RBC QN AUTO: 30.5 PG (ref 26.5–33)
MCHC RBC AUTO-ENTMCNC: 34.4 G/DL (ref 31.5–36.5)
MCV RBC AUTO: 89 FL (ref 78–100)
MONOCYTES # BLD AUTO: 0.7 10E3/UL (ref 0–1.3)
MONOCYTES NFR BLD AUTO: 10 %
NEUTROPHILS # BLD AUTO: 3.6 10E3/UL (ref 1.6–8.3)
NEUTROPHILS NFR BLD AUTO: 52 %
NITRATE UR QL: NEGATIVE
PH UR STRIP: 6 [PH] (ref 5–7)
PLATELET # BLD AUTO: 303 10E3/UL (ref 150–450)
POTASSIUM BLD-SCNC: 4 MMOL/L (ref 3.4–5.3)
PROT SERPL-MCNC: 7.9 G/DL (ref 6.8–8.8)
RBC # BLD AUTO: 5.09 10E6/UL (ref 4.4–5.9)
SODIUM SERPL-SCNC: 138 MMOL/L (ref 133–144)
SP GR UR STRIP: 1.02 (ref 1–1.03)
UROBILINOGEN UR STRIP-ACNC: 0.2 E.U./DL
WBC # BLD AUTO: 6.8 10E3/UL (ref 4–11)

## 2022-10-18 PROCEDURE — 36415 COLL VENOUS BLD VENIPUNCTURE: CPT | Performed by: NURSE PRACTITIONER

## 2022-10-18 PROCEDURE — 85025 COMPLETE CBC W/AUTO DIFF WBC: CPT | Performed by: NURSE PRACTITIONER

## 2022-10-18 PROCEDURE — 81003 URINALYSIS AUTO W/O SCOPE: CPT | Performed by: NURSE PRACTITIONER

## 2022-10-18 PROCEDURE — 83690 ASSAY OF LIPASE: CPT | Performed by: NURSE PRACTITIONER

## 2022-10-18 PROCEDURE — 80053 COMPREHEN METABOLIC PANEL: CPT | Performed by: NURSE PRACTITIONER

## 2022-10-18 PROCEDURE — 99214 OFFICE O/P EST MOD 30 MIN: CPT | Performed by: NURSE PRACTITIONER

## 2022-10-18 ASSESSMENT — PAIN SCALES - GENERAL: PAINLEVEL: NO PAIN (0)

## 2022-10-18 NOTE — PROGRESS NOTES
"  Assessment & Plan     Right sided abdominal pain  Labs unremarkable. Recommend US. Gallbladder?  - Comprehensive metabolic panel (BMP + Alb, Alk Phos, ALT, AST, Total. Bili, TP); Future  - CBC with platelets and differential; Future  - Lipase; Future  - UA macro with reflex to Microscopic and Culture - Clinc Collect; Future  - US Abdomen Complete; Future  - Comprehensive metabolic panel (BMP + Alb, Alk Phos, ALT, AST, Total. Bili, TP)  - CBC with platelets and differential  - Lipase  - UA macro with reflex to Microscopic and Culture - Clinc Collect    Elevated blood pressure reading without diagnosis of hypertension  Declined second BP check. Patient is asymptomatic.           BMI:   Estimated body mass index is 39.28 kg/m  as calculated from the following:    Height as of this encounter: 1.753 m (5' 9\").    Weight as of this encounter: 120.7 kg (266 lb).       See Patient Instructions    Return in about 1 week (around 10/25/2022), or if symptoms worsen or fail to improve.       Return precautions discussed, including when to seek urgent/emergent care.    Patient verbalizes understanding and agrees with plan of care. Patient stable for discharge.      TESSA SÁNCHEZ CNP  [facility]    Judit Trotter is a 32 year old, presenting for the following health issues:  Abdominal Pain      History of Present Illness       Reason for visit:  Infrequent muscle/abdomen pain  Symptom onset:  More than a month  Symptoms include:  Pain on side of body, sometimes near abdomen.  Symptom intensity:  Moderate  Symptom progression:  Improving  Had these symptoms before:  Yes  Has tried/received treatment for these symptoms:  No    He eats 0-1 servings of fruits and vegetables daily.He consumes 0 sweetened beverage(s) daily.He exercises with enough effort to increase his heart rate 10 to 19 minutes per day.  He exercises with enough effort to increase his heart rate 3 or less days per week. " "  He is taking medications regularly.     1 month ago right side abdominal pain  Lasted 12-13 hours  Dry heaving and vomiting with most painful times  Has happened periodically before  Gotten more frequent over the last year since working from home  2 weeks ago happened again and was very sharp for 5 min then resoloved. Had ache then sharp pain. Lasted 4-5 hours  Thought kidney stone - No blood in urine and no irritation with urination  Hernia - not regular pain  Now thinks just muscular  Eating doesn't affect  Stools are baseline. No blood.  No fever            Review of Systems   Constitutional, HEENT, cardiovascular, pulmonary, GI, , musculoskeletal, neuro, skin, endocrine and psych systems are negative, except as otherwise noted.      Objective    BP (!) 141/84 (BP Location: Left arm, Patient Position: Sitting, Cuff Size: Adult Large)   Pulse 95   Temp 97.4  F (36.3  C) (Temporal)   Resp 20   Ht 1.753 m (5' 9\")   Wt 120.7 kg (266 lb)   SpO2 98%   BMI 39.28 kg/m    Body mass index is 39.28 kg/m .  Physical Exam   GENERAL: healthy, alert and no distress  RESP: lungs clear to auscultation - no rales, rhonchi or wheezes  CV: regular rate and rhythm, normal S1 S2, no S3 or S4, no murmur, click or rub, no peripheral edema  ABDOMEN: soft, nontender, obese abdomen and bowel sounds normal  MS: no gross musculoskeletal defects noted, no edema  PSYCH: mentation appears normal, affect normal/bright    Results for orders placed or performed in visit on 10/18/22   Comprehensive metabolic panel (BMP + Alb, Alk Phos, ALT, AST, Total. Bili, TP)     Status: Abnormal   Result Value Ref Range    Sodium 138 133 - 144 mmol/L    Potassium 4.0 3.4 - 5.3 mmol/L    Chloride 106 94 - 109 mmol/L    Carbon Dioxide (CO2) 32 20 - 32 mmol/L    Anion Gap <1 (L) 3 - 14 mmol/L    Urea Nitrogen 11 7 - 30 mg/dL    Creatinine 0.71 0.66 - 1.25 mg/dL    Calcium 9.1 8.5 - 10.1 mg/dL    Glucose 101 (H) 70 - 99 mg/dL    Alkaline Phosphatase 59 " 40 - 150 U/L    AST 11 0 - 45 U/L    ALT 21 0 - 70 U/L    Protein Total 7.9 6.8 - 8.8 g/dL    Albumin 3.9 3.4 - 5.0 g/dL    Bilirubin Total 0.8 0.2 - 1.3 mg/dL    GFR Estimate >90 >60 mL/min/1.73m2   Lipase     Status: Normal   Result Value Ref Range    Lipase 88 73 - 393 U/L   UA macro with reflex to Microscopic and Culture - Clinc Collect     Status: Normal    Specimen: Urine, NOS   Result Value Ref Range    Color Urine Yellow Colorless, Straw, Light Yellow, Yellow    Appearance Urine Clear Clear    Glucose Urine Negative Negative mg/dL    Bilirubin Urine Negative Negative    Ketones Urine Negative Negative mg/dL    Specific Gravity Urine 1.025 1.003 - 1.035    Blood Urine Negative Negative    pH Urine 6.0 5.0 - 7.0    Protein Albumin Urine Negative Negative mg/dL    Urobilinogen Urine 0.2 0.2, 1.0 E.U./dL    Nitrite Urine Negative Negative    Leukocyte Esterase Urine Negative Negative    Narrative    Microscopic not indicated   CBC with platelets and differential     Status: None   Result Value Ref Range    WBC Count 6.8 4.0 - 11.0 10e3/uL    RBC Count 5.09 4.40 - 5.90 10e6/uL    Hemoglobin 15.5 13.3 - 17.7 g/dL    Hematocrit 45.1 40.0 - 53.0 %    MCV 89 78 - 100 fL    MCH 30.5 26.5 - 33.0 pg    MCHC 34.4 31.5 - 36.5 g/dL    RDW 11.9 10.0 - 15.0 %    Platelet Count 303 150 - 450 10e3/uL    % Neutrophils 52 %    % Lymphocytes 35 %    % Monocytes 10 %    % Eosinophils 2 %    % Basophils 1 %    % Immature Granulocytes 0 %    Absolute Neutrophils 3.6 1.6 - 8.3 10e3/uL    Absolute Lymphocytes 2.4 0.8 - 5.3 10e3/uL    Absolute Monocytes 0.7 0.0 - 1.3 10e3/uL    Absolute Eosinophils 0.2 0.0 - 0.7 10e3/uL    Absolute Basophils 0.0 0.0 - 0.2 10e3/uL    Absolute Immature Granulocytes 0.0 <=0.4 10e3/uL   CBC with platelets and differential     Status: None    Narrative    The following orders were created for panel order CBC with platelets and differential.  Procedure                               Abnormality         Status                      ---------                               -----------         ------                     CBC with platelets and d...[[id number]]                      Final result                 Please view results for these tests on the individual orders.

## 2022-10-18 NOTE — PATIENT INSTRUCTIONS
To schedule your imaging exam at any of the [facility] imaging locations, please call [phone]      Patient Education    At [facility], we strive to deliver an exceptional experience to you, every time we see you. If you receive a survey, please complete it as we do value your feedback.  If you have MyChart, you can expect to receive results automatically within 24 hours of their completion.  Your provider will send a note interpreting your results as well.   If you do not have MyChart, you should receive your results in about a week by mail.    Your care team:                            Family Medicine Internal Medicine   MD Barrett Ritchie MD Shantel Branch-Fleming, MD Srinivasa Vaka, MD Katya Belousova, TESSA Martinez CNP, MD ([facility]   Cirilo Garcia, MD Lara Lloyd MD Amelia Massimini APRN CNP Kim Thein, APRN CNP Bethany Templen, MD             Clinic hours: Monday - Thursday 7 am-6 pm; Fridays 7 am-5 pm.   Urgent care: Monday - Friday 10 am- 8 pm; Saturday and Sunday 9 am-5 pm.    Clinic: [phone]       [facility]: Monday - Thursday 8 am - 7 pm; Friday 8 am - 6 pm  [facility]: [phone]

## 2022-10-30 ENCOUNTER — HOSPITAL ENCOUNTER (OUTPATIENT)
Dept: ULTRASOUND IMAGING | Facility: CLINIC | Age: 32
Discharge: HOME | End: 2022-10-30
Attending: NURSE PRACTITIONER | Admitting: NURSE PRACTITIONER
Payer: COMMERCIAL

## 2022-10-30 DIAGNOSIS — R10.9 RIGHT SIDED ABDOMINAL PAIN: ICD-10-CM

## 2022-10-30 PROCEDURE — 76700 US EXAM ABDOM COMPLETE: CPT

## 2022-10-31 DIAGNOSIS — N28.9 RENAL LESION: ICD-10-CM

## 2022-10-31 DIAGNOSIS — R93.5 ABNORMAL ABDOMINAL ULTRASOUND: Primary | ICD-10-CM

## 2022-11-05 ENCOUNTER — HOSPITAL ENCOUNTER (OUTPATIENT)
Dept: CT IMAGING | Facility: CLINIC | Age: 32
Discharge: HOME | End: 2022-11-05
Attending: NURSE PRACTITIONER | Admitting: NURSE PRACTITIONER
Payer: COMMERCIAL

## 2022-11-05 DIAGNOSIS — N28.9 RENAL LESION: ICD-10-CM

## 2022-11-05 DIAGNOSIS — R93.5 ABNORMAL ABDOMINAL ULTRASOUND: ICD-10-CM

## 2022-11-05 PROCEDURE — 250N000011 HC RX IP 250 OP 636: Performed by: RADIOLOGY

## 2022-11-05 PROCEDURE — 250N000009 HC RX 250: Performed by: RADIOLOGY

## 2022-11-05 PROCEDURE — 74178 CT ABD&PLV WO CNTR FLWD CNTR: CPT

## 2022-11-05 RX ORDER — IOPAMIDOL 755 MG/ML
100 INJECTION, SOLUTION INTRAVASCULAR ONCE
Status: DISCONTINUED | OUTPATIENT
Start: 2022-11-05 | End: 2022-11-05

## 2022-11-05 RX ORDER — IOPAMIDOL 755 MG/ML
100 INJECTION, SOLUTION INTRAVASCULAR ONCE
Status: COMPLETED | OUTPATIENT
Start: 2022-11-05 | End: 2022-11-05

## 2022-11-05 RX ADMIN — IOPAMIDOL 100 ML: 755 INJECTION, SOLUTION INTRAVENOUS at 12:50

## 2022-11-05 RX ADMIN — SODIUM CHLORIDE 83 ML: 9 INJECTION, SOLUTION INTRAVENOUS at 12:50

## 2022-11-07 DIAGNOSIS — R93.5 ABNORMAL COMPUTED TOMOGRAPHY OF ABDOMEN AND PELVIS: ICD-10-CM

## 2022-11-07 DIAGNOSIS — R93.5 ABNORMAL ABDOMINAL ULTRASOUND: Primary | ICD-10-CM

## 2022-11-07 DIAGNOSIS — N28.89 DILATED RENAL PELVIS: ICD-10-CM

## 2023-08-13 ASSESSMENT — ASTHMA QUESTIONNAIRES: ACT_TOTALSCORE: 23

## 2023-08-14 ENCOUNTER — VIRTUAL VISIT (OUTPATIENT)
Dept: FAMILY MEDICINE | Facility: CLINIC | Age: 33
End: 2023-08-14
Payer: COMMERCIAL

## 2023-08-14 DIAGNOSIS — J40 BRONCHITIS: Primary | ICD-10-CM

## 2023-08-14 DIAGNOSIS — E55.9 VITAMIN D DEFICIENCY: ICD-10-CM

## 2023-08-14 DIAGNOSIS — J45.20 MILD INTERMITTENT ASTHMA WITHOUT COMPLICATION: ICD-10-CM

## 2023-08-14 PROCEDURE — 99214 OFFICE O/P EST MOD 30 MIN: CPT | Mod: VID | Performed by: FAMILY MEDICINE

## 2023-08-14 RX ORDER — AZITHROMYCIN 250 MG/1
TABLET, FILM COATED ORAL
Qty: 6 TABLET | Refills: 0 | Status: SHIPPED | OUTPATIENT
Start: 2023-08-14 | End: 2023-08-19

## 2023-08-14 RX ORDER — ALBUTEROL SULFATE 90 UG/1
2 AEROSOL, METERED RESPIRATORY (INHALATION) EVERY 6 HOURS PRN
Qty: 1 G | Refills: 1 | Status: SHIPPED | OUTPATIENT
Start: 2023-08-14

## 2023-08-14 NOTE — PROGRESS NOTES
"Sergo is a 33 year old who is being evaluated via a billable video visit.      How would you like to obtain your AVS? MyChart  If the video visit is dropped, the invitation should be resent by: Text to cell phone: [phone]  Will anyone else be joining your video visit? No          Assessment & Plan     (J40) Bronchitis  (primary encounter diagnosis)  Comment:   Plan: azithromycin (ZITHROMAX) 250 MG tablet              URI lingering onto bronchitis. Treat with z pack. Cares and symptomatic treatment discussed .follow up if problem       (J45.20) Mild intermittent asthma without complication  Comment: although no sob at this time but could use refill  Plan: albuterol (PROAIR HFA/PROVENTIL HFA/VENTOLIN         HFA) 108 (90 Base) MCG/ACT inhaler          Discussed asthma / triggers and treatment. Clinically doing ok , but needs refill   Refill give on albuterol to use as needed. Need to monitor the need for albuterol .   Cares and symptomatic treatment discussed follow up   if problem        (E55.9) Vitamin D deficiency  Comment:  concerned and wants labs   Plan: Vitamin D Deficiency              Check labs. Scripts  sent.Cares and  treatment discussed.  follow up if problem   Patient expressed understanding and agreement with treatment plan. All patient's questions were answered, will let me know if has more later.  Medications: Rx's: Reviewed the potential side effects/complications of medications prescribed.          BMI:   Estimated body mass index is 39.28 kg/m  as calculated from the following:    Height as of 10/18/22: 1.753 m (5' 9\").    Weight as of 10/18/22: 120.7 kg (266 lb).   Weight management plan: encouraged to schedule yearly physical and need to monitor BP as well       Yarely Villalba MD  [facility] [place]    Judit Trotter is a 33 year old, presenting for the following health issues:  No chief complaint on file.      8/14/2023     7:46 AM   Additional Questions "   Roomed by Marai Ines       History of Present Illness       Reason for visit:  Flu symptoms  Symptom onset:  3-7 days ago  Symptoms include:  Fever, dry cough.  Symptom intensity:  Moderate  Symptom progression:  Staying the same  Had these symptoms before:  No  What makes it better:  Ibuproufen/acetamenophene lowers fever symptoms and initially cough syrup but now it has negligible effect.    He eats 0-1 servings of fruits and vegetables daily.He consumes 1 sweetened beverage(s) daily.He exercises with enough effort to increase his heart rate 9 or less minutes per day.  He exercises with enough effort to increase his heart rate 3 or less days per week.   He is taking medications regularly.   Has uri sx past 5-6 days. Initial flu symptoms--cough, aches, fever, sweating. Persisted for several days. Fever broke for one day before coming back at 101 F. With persistent dry cough.   No recent travel  or exposure to illnesses . Had negative home Covid  test earlier , when he first started getting sick  Cough is persistent and chest congestion and hard to bring up mucous     Asthma Follow-Up    Was ACT completed today?  Yes  . Has mostly intermittent sx triggered by URI, but at this time breathing is not bad. And feeling ok. But can use refill just in case       8/13/2023     9:27 PM   ACT Total Scores   ACT TOTAL SCORE (Goal Greater than or Equal to 20) 23   In the past 12 months, how many times did you visit the emergency room for your asthma without being admitted to the hospital? 0   In the past 12 months, how many times were you hospitalized overnight because of your asthma? 0        How many days per week do you miss taking your asthma controller medication?  I do not have an asthma controller medication  Please describe any recent triggers for your asthma: None but uri can trigger asthma. He could use refill on inhaler , just so he has one but no current sob and lungs feel ok   Have you had any Emergency Room  Visits, Urgent Care Visits, or Hospital Admissions since your last office visit?  No      Add on problem     Wants vit-d level checked bc many family members have been low. He denies a y unusual fatigue or other sx at time but want lab order      Review of Systems   Constitutional, HEENT, cardiovascular, pulmonary, GI, , musculoskeletal, neuro, skin, endocrine and psych systems are negative, except as otherwise noted.      Objective           Vitals:  No vitals were obtained today due to virtual visit.    Physical Exam   GENERAL: Healthy, alert and no distress  EYES: Eyes grossly normal to inspection.  No discharge or erythema, or obvious scleral/conjunctival abnormalities.  RESP: No audible wheeze, cough, or visible cyanosis.  No visible retractions or increased work of breathing.    SKIN: Visible skin clear. No significant rash, abnormal pigmentation or lesions.  NEURO: Cranial nerves grossly intact.  Mentation and speech appropriate for age.  PSYCH: Mentation appears normal, affect normal/bright, judgement and insight intact, normal speech and appearance well-groomed.        Video-Visit Details    Type of service:  Video Visit     Originating Location (pt. Location): Home    Distant Location (provider location):  Off-site  Platform used for Video Visit: AmWell did not work as good so then also used doximity

## 2023-10-03 ENCOUNTER — LAB (OUTPATIENT)
Dept: LAB | Facility: CLINIC | Age: 33
End: 2023-10-03
Payer: COMMERCIAL

## 2023-10-03 DIAGNOSIS — E55.9 VITAMIN D DEFICIENCY: ICD-10-CM

## 2023-10-03 LAB — VIT D+METAB SERPL-MCNC: 14 NG/ML (ref 20–50)

## 2023-10-03 PROCEDURE — 82306 VITAMIN D 25 HYDROXY: CPT

## 2023-10-03 PROCEDURE — 36415 COLL VENOUS BLD VENIPUNCTURE: CPT

## 2024-01-29 ENCOUNTER — IMMUNIZATION (OUTPATIENT)
Dept: NURSING | Facility: CLINIC | Age: 34
End: 2024-01-29
Payer: COMMERCIAL

## 2024-01-29 PROCEDURE — 91320 SARSCV2 VAC 30MCG TRS-SUC IM: CPT

## 2024-01-29 PROCEDURE — 90480 ADMN SARSCOV2 VAC 1/ONLY CMP: CPT

## 2024-02-10 ENCOUNTER — HEALTH MAINTENANCE LETTER (OUTPATIENT)
Age: 34
End: 2024-02-10

## 2025-03-08 ENCOUNTER — HEALTH MAINTENANCE LETTER (OUTPATIENT)
Age: 35
End: 2025-03-08

## 2025-07-10 ENCOUNTER — OFFICE VISIT (OUTPATIENT)
Dept: FAMILY MEDICINE | Facility: CLINIC | Age: 35
End: 2025-07-10
Payer: COMMERCIAL

## 2025-07-10 VITALS
RESPIRATION RATE: 20 BRPM | WEIGHT: 287 LBS | DIASTOLIC BLOOD PRESSURE: 84 MMHG | TEMPERATURE: 98 F | HEIGHT: 69 IN | OXYGEN SATURATION: 96 % | HEART RATE: 113 BPM | BODY MASS INDEX: 42.51 KG/M2 | SYSTOLIC BLOOD PRESSURE: 136 MMHG

## 2025-07-10 DIAGNOSIS — L55.1 SUNBURN, BLISTERING: Primary | ICD-10-CM

## 2025-07-10 PROCEDURE — 3079F DIAST BP 80-89 MM HG: CPT

## 2025-07-10 PROCEDURE — 1125F AMNT PAIN NOTED PAIN PRSNT: CPT

## 2025-07-10 PROCEDURE — 3075F SYST BP GE 130 - 139MM HG: CPT

## 2025-07-10 PROCEDURE — 99213 OFFICE O/P EST LOW 20 MIN: CPT

## 2025-07-10 ASSESSMENT — ASTHMA QUESTIONNAIRES
QUESTION_1 LAST FOUR WEEKS HOW MUCH OF THE TIME DID YOUR ASTHMA KEEP YOU FROM GETTING AS MUCH DONE AT WORK, SCHOOL OR AT HOME: A LITTLE OF THE TIME
ACT_TOTALSCORE: 21
QUESTION_4 LAST FOUR WEEKS HOW OFTEN HAVE YOU USED YOUR RESCUE INHALER OR NEBULIZER MEDICATION (SUCH AS ALBUTEROL): ONCE A WEEK OR LESS
QUESTION_2 LAST FOUR WEEKS HOW OFTEN HAVE YOU HAD SHORTNESS OF BREATH: ONCE OR TWICE A WEEK
QUESTION_3 LAST FOUR WEEKS HOW OFTEN DID YOUR ASTHMA SYMPTOMS (WHEEZING, COUGHING, SHORTNESS OF BREATH, CHEST TIGHTNESS OR PAIN) WAKE YOU UP AT NIGHT OR EARLIER THAN USUAL IN THE MORNING: NOT AT ALL
QUESTION_5 LAST FOUR WEEKS HOW WOULD YOU RATE YOUR ASTHMA CONTROL: WELL CONTROLLED

## 2025-07-10 ASSESSMENT — PAIN SCALES - GENERAL: PAINLEVEL_OUTOF10: MILD PAIN (2)

## 2025-07-10 NOTE — PROGRESS NOTES
"  {PROVIDER CHARTING PREFERENCE:414351}    Subjective   Sergo is a 35 year old, presenting for the following health issues:  Derm Problem (3-7 days ago, spent 1 hour in the sun with no sunscreen)        7/10/2025     1:32 PM   Additional Questions   Roomed by concepcion pendleton ma     History of Present Illness       Reason for visit:  Sunburn  Symptom onset:  3-7 days ago   He is taking medications regularly.        No fevers but has felt a little flush.       No new supplements/vitamins.   No medications recently like antibiotics.  No new topicals.           {MA/LPN/RN Pre-Provider Visit Orders- hCG/UA/Strep (Optional):915004}  {SUPERLIST (Optional):475315}  {additonal problems for provider to add (Optional):986233}    {ROS Picklists (Optional):230016}      Objective    /84 (BP Location: Right arm, Patient Position: Sitting, Cuff Size: Adult Large)   Pulse 113   Temp 98  F (36.7  C) (Oral)   Resp 20   Ht 1.753 m (5' 9\")   Wt 130.2 kg (287 lb)   SpO2 96%   BMI 42.38 kg/m    Body mass index is 42.38 kg/m .  Physical Exam   {Exam List (Optional):728369}    {Diagnostic Test Results (Optional):807069}        Signed Electronically by: TESSA Bautista CNP  {Email feedback regarding this note to [email or web address]   :015321}  "

## 2025-07-10 NOTE — PATIENT INSTRUCTIONS
Ibuprofen 400-600 MG 4 times better.    Calamine lotion   Avoid restrictive clothing  Cool compress/cold shower

## 2025-08-21 ENCOUNTER — OFFICE VISIT (OUTPATIENT)
Dept: FAMILY MEDICINE | Facility: CLINIC | Age: 35
End: 2025-08-21
Payer: COMMERCIAL

## 2025-08-21 VITALS
BODY MASS INDEX: 41.32 KG/M2 | TEMPERATURE: 97 F | SYSTOLIC BLOOD PRESSURE: 131 MMHG | OXYGEN SATURATION: 96 % | RESPIRATION RATE: 16 BRPM | HEIGHT: 69 IN | HEART RATE: 99 BPM | WEIGHT: 279 LBS | DIASTOLIC BLOOD PRESSURE: 82 MMHG

## 2025-08-21 DIAGNOSIS — H61.23 BILATERAL IMPACTED CERUMEN: Primary | ICD-10-CM

## 2025-08-21 ASSESSMENT — PAIN SCALES - GENERAL: PAINLEVEL_OUTOF10: NO PAIN (0)
